# Patient Record
Sex: MALE | Race: OTHER | HISPANIC OR LATINO
[De-identification: names, ages, dates, MRNs, and addresses within clinical notes are randomized per-mention and may not be internally consistent; named-entity substitution may affect disease eponyms.]

---

## 2018-10-12 ENCOUNTER — FORM ENCOUNTER (OUTPATIENT)
Age: 58
End: 2018-10-12

## 2020-04-23 ENCOUNTER — EMERGENCY (EMERGENCY)
Facility: HOSPITAL | Age: 60
LOS: 1 days | Discharge: HOME | End: 2020-04-23
Admitting: EMERGENCY MEDICINE
Payer: COMMERCIAL

## 2020-04-23 VITALS
DIASTOLIC BLOOD PRESSURE: 71 MMHG | TEMPERATURE: 98 F | RESPIRATION RATE: 18 BRPM | HEART RATE: 110 BPM | OXYGEN SATURATION: 97 % | SYSTOLIC BLOOD PRESSURE: 150 MMHG

## 2020-04-23 VITALS
HEART RATE: 91 BPM | OXYGEN SATURATION: 99 % | TEMPERATURE: 98 F | DIASTOLIC BLOOD PRESSURE: 68 MMHG | SYSTOLIC BLOOD PRESSURE: 133 MMHG | RESPIRATION RATE: 17 BRPM

## 2020-04-23 LAB
ALBUMIN SERPL ELPH-MCNC: 2.9 G/DL — LOW (ref 3.4–5)
ALP SERPL-CCNC: 173 U/L — HIGH (ref 40–120)
ALT FLD-CCNC: 36 U/L — SIGNIFICANT CHANGE UP (ref 12–42)
ANION GAP SERPL CALC-SCNC: 9 MMOL/L — SIGNIFICANT CHANGE UP (ref 9–16)
APTT BLD: 33.5 SEC — SIGNIFICANT CHANGE UP (ref 27.5–36.3)
AST SERPL-CCNC: 144 U/L — HIGH (ref 15–37)
BASOPHILS # BLD AUTO: 0.03 K/UL — SIGNIFICANT CHANGE UP (ref 0–0.2)
BASOPHILS NFR BLD AUTO: 0.6 % — SIGNIFICANT CHANGE UP (ref 0–2)
BILIRUB SERPL-MCNC: 1.7 MG/DL — HIGH (ref 0.2–1.2)
BUN SERPL-MCNC: 13 MG/DL — SIGNIFICANT CHANGE UP (ref 7–23)
CALCIUM SERPL-MCNC: 7.9 MG/DL — LOW (ref 8.5–10.5)
CHLORIDE SERPL-SCNC: 104 MMOL/L — SIGNIFICANT CHANGE UP (ref 96–108)
CO2 SERPL-SCNC: 22 MMOL/L — SIGNIFICANT CHANGE UP (ref 22–31)
CREAT SERPL-MCNC: 0.83 MG/DL — SIGNIFICANT CHANGE UP (ref 0.5–1.3)
EOSINOPHIL # BLD AUTO: 0.04 K/UL — SIGNIFICANT CHANGE UP (ref 0–0.5)
EOSINOPHIL NFR BLD AUTO: 0.8 % — SIGNIFICANT CHANGE UP (ref 0–6)
GLUCOSE SERPL-MCNC: 118 MG/DL — HIGH (ref 70–99)
HCT VFR BLD CALC: 32.6 % — LOW (ref 39–50)
HGB BLD-MCNC: 11.1 G/DL — LOW (ref 13–17)
IMM GRANULOCYTES NFR BLD AUTO: 0.4 % — SIGNIFICANT CHANGE UP (ref 0–1.5)
INR BLD: 1.3 — HIGH (ref 0.88–1.16)
LYMPHOCYTES # BLD AUTO: 0.92 K/UL — LOW (ref 1–3.3)
LYMPHOCYTES # BLD AUTO: 18.9 % — SIGNIFICANT CHANGE UP (ref 13–44)
MCHC RBC-ENTMCNC: 34 GM/DL — SIGNIFICANT CHANGE UP (ref 32–36)
MCHC RBC-ENTMCNC: 34.9 PG — HIGH (ref 27–34)
MCV RBC AUTO: 102.5 FL — HIGH (ref 80–100)
MONOCYTES # BLD AUTO: 0.53 K/UL — SIGNIFICANT CHANGE UP (ref 0–0.9)
MONOCYTES NFR BLD AUTO: 10.9 % — SIGNIFICANT CHANGE UP (ref 2–14)
NEUTROPHILS # BLD AUTO: 3.33 K/UL — SIGNIFICANT CHANGE UP (ref 1.8–7.4)
NEUTROPHILS NFR BLD AUTO: 68.4 % — SIGNIFICANT CHANGE UP (ref 43–77)
NRBC # BLD: 0 /100 WBCS — SIGNIFICANT CHANGE UP (ref 0–0)
PLATELET # BLD AUTO: 50 K/UL — LOW (ref 150–400)
POTASSIUM SERPL-MCNC: 4.7 MMOL/L — SIGNIFICANT CHANGE UP (ref 3.5–5.3)
POTASSIUM SERPL-SCNC: 4.7 MMOL/L — SIGNIFICANT CHANGE UP (ref 3.5–5.3)
PROT SERPL-MCNC: 7.6 G/DL — SIGNIFICANT CHANGE UP (ref 6.4–8.2)
PROTHROM AB SERPL-ACNC: 14.6 SEC — HIGH (ref 10–12.9)
RBC # BLD: 3.18 M/UL — LOW (ref 4.2–5.8)
RBC # FLD: 16.2 % — HIGH (ref 10.3–14.5)
SARS-COV-2 RNA SPEC QL NAA+PROBE: SIGNIFICANT CHANGE UP
SODIUM SERPL-SCNC: 135 MMOL/L — SIGNIFICANT CHANGE UP (ref 132–145)
TROPONIN I SERPL-MCNC: <0.017 NG/ML — LOW (ref 0.02–0.06)
TSH SERPL-MCNC: 3.23 UIU/ML — SIGNIFICANT CHANGE UP (ref 0.36–3.74)
WBC # BLD: 4.87 K/UL — SIGNIFICANT CHANGE UP (ref 3.8–10.5)
WBC # FLD AUTO: 4.87 K/UL — SIGNIFICANT CHANGE UP (ref 3.8–10.5)

## 2020-04-23 PROCEDURE — 93010 ELECTROCARDIOGRAM REPORT: CPT

## 2020-04-23 PROCEDURE — 99285 EMERGENCY DEPT VISIT HI MDM: CPT

## 2020-04-23 PROCEDURE — 71045 X-RAY EXAM CHEST 1 VIEW: CPT | Mod: 26

## 2020-04-23 NOTE — ED PROVIDER NOTE - PATIENT PORTAL LINK FT
You can access the FollowMyHealth Patient Portal offered by [facility] by registering at the following website: [email or web address]. By joining [employer]’s FollowMyHealth portal, you will also be able to view your health information using other applications (apps) compatible with our system.

## 2020-04-23 NOTE — ED PROVIDER NOTE - NSFOLLOWUPINSTRUCTIONS_ED_ALL_ED_FT
You have a low platelet count. Please follow up with your primary care physician to have your blood work drawn in 1 week to recheck your platelet count and to be further evaluated for this.    Return to the ER immediately if your dizziness or racing heart beat returns, or if you develop a high fever, shaking chills, chest pain, difficulty breathing, loss of consciousness, any abnormal bleeding or bruising, or for any other concerns.

## 2020-04-23 NOTE — ED PROVIDER NOTE - CLINICAL SUMMARY MEDICAL DECISION MAKING FREE TEXT BOX
EKG, labs and imaging reviewed. Pt informed of low platelet count and elevated LFTs, likely related to ETOH. Pt remains asymptomatic with no other complaints at this time and would like to be discharged home. No recent abnormal bruising or bleeding. Discussed case with Dr. Lynch (Hematology) who recommends having pt F/U with PMD for repeat labs in 1 week. Pt agrees to F/U as instructed. Strict return precautions reviewed with pt in which pt verbalizes understanding and agrees to.

## 2020-04-23 NOTE — ED PROVIDER NOTE - OBJECTIVE STATEMENT
58 y/o male with PMHx of HTN presents to the ED with complaints of an episode of nonspecific dizziness associated with racing palpitations and breathing with a moment of chest tightness that [place]. Patient states that symptoms lasted for several minutes without any aggravating or alleviating factors in which 911 was called, and EMS shortly arrived afterwards. Patient was transported without any incidences of complaint, and symptoms slowly improved en route. Patient is now feeling much better and states that he assumes symptoms were likely attributed to his HTN. He did not have any associated symptoms at the time, and denies any additional symptoms in the ED. Denies fever, chills, headache, confusion, syncope, SOB, cough, hemoptysis, abdominal pain, N/V/D, focal weakness, fall, or injuries. 60 y/o male with PMHx of HTN and ETOH abuse presents to the ED with complaints of an episode of nonspecific dizziness associated with racing palpitations and breathing with a moment of chest tightness that [place]. Patient states that symptoms lasted for several minutes without any aggravating or alleviating factors in which 911 was called, and EMS shortly arrived afterwards. Patient was transported without any incidences of complaint, and symptoms slowly improved en route. Patient is now feeling much better and states that he assumes symptoms were likely attributed to his HTN. He did not have any associated symptoms at the time, and denies any additional symptoms in the ED. Denies fever, chills, headache, confusion, syncope, SOB, cough, hemoptysis, abdominal pain, N/V/D, focal weakness, fall, or injuries.

## 2020-04-23 NOTE — ED PROVIDER NOTE - CHPI ED SYMPTOMS NEG
no confusion/no fever/no chills, no headache, no syncope, no cough, no hemoptysis, no abdominal pain, no N/V/D, no focal weakness, no fall, no injuries

## 2020-04-27 DIAGNOSIS — Z20.828 CONTACT WITH AND (SUSPECTED) EXPOSURE TO OTHER VIRAL COMMUNICABLE DISEASES: ICD-10-CM

## 2020-04-27 DIAGNOSIS — R42 DIZZINESS AND GIDDINESS: ICD-10-CM

## 2022-04-05 NOTE — ED ADULT NURSE NOTE - DRUG PRE-SCREENING (DAST -1)
Routing refill request to provider for review/approval because:  Drug not on the [facility] refill protocol        Statement Selected

## 2022-05-17 NOTE — ED ADULT TRIAGE NOTE - TEMPERATURE IN FAHRENHEIT (DEGREES F)
Impression: Other long term (current) drug therapy: Z79.899. Plan: Plaquenil use without macular toxicity, ok to cont med. No ring of depigmented retinal pigment epithelium OU
PLAQUENIL - confirmed dose 200mg daily. NO medication induced retinal damage. YEARLY exam.  
OCT images shows no damages to macular. * Get HVF 10-2 and SD OCT annually as recommended by  Plaquenil Retinal Surveillance Protocol RTC in 6 months for CE with REPEAT MAC and HVF 10-2 with Dr. Franck Vizcarra 98

## 2023-02-21 ENCOUNTER — HOSPITAL ENCOUNTER (INPATIENT)
Dept: HOSPITAL 74 - YASAS | Age: 63
LOS: 4 days | Discharge: LEFT BEFORE DISCHARGE | DRG: 770 | End: 2023-02-25
Attending: SURGERY | Admitting: ALLERGY & IMMUNOLOGY
Payer: COMMERCIAL

## 2023-02-21 VITALS — BODY MASS INDEX: 29.9 KG/M2

## 2023-02-21 DIAGNOSIS — D69.6: ICD-10-CM

## 2023-02-21 DIAGNOSIS — F10.230: Primary | ICD-10-CM

## 2023-02-21 DIAGNOSIS — K74.60: ICD-10-CM

## 2023-02-21 PROCEDURE — HZ2ZZZZ DETOXIFICATION SERVICES FOR SUBSTANCE ABUSE TREATMENT: ICD-10-PCS | Performed by: SURGERY

## 2023-02-21 PROCEDURE — U0003 INFECTIOUS AGENT DETECTION BY NUCLEIC ACID (DNA OR RNA); SEVERE ACUTE RESPIRATORY SYNDROME CORONAVIRUS 2 (SARS-COV-2) (CORONAVIRUS DISEASE [COVID-19]), AMPLIFIED PROBE TECHNIQUE, MAKING USE OF HIGH THROUGHPUT TECHNOLOGIES AS DESCRIBED BY CMS-2020-01-R: HCPCS

## 2023-02-21 PROCEDURE — U0005 INFEC AGEN DETEC AMPLI PROBE: HCPCS

## 2023-02-21 RX ADMIN — Medication PRN MG: at 22:44

## 2023-02-21 RX ADMIN — Medication SCH MG: at 22:44

## 2023-02-22 LAB
ALBUMIN SERPL-MCNC: 2.4 G/DL (ref 3.4–5)
ALP SERPL-CCNC: 199 U/L (ref 45–117)
ALT SERPL-CCNC: 55 U/L (ref 13–61)
ANION GAP SERPL CALC-SCNC: 7 MMOL/L (ref 8–16)
AST SERPL-CCNC: 131 U/L (ref 15–37)
BILIRUB SERPL-MCNC: 5.4 MG/DL (ref 0.2–1)
BUN SERPL-MCNC: 7.4 MG/DL (ref 7–18)
CALCIUM SERPL-MCNC: 8 MG/DL (ref 8.5–10.1)
CHLORIDE SERPL-SCNC: 105 MMOL/L (ref 98–107)
CO2 SERPL-SCNC: 24 MMOL/L (ref 21–32)
CREAT SERPL-MCNC: 0.7 MG/DL (ref 0.55–1.3)
DEPRECATED RDW RBC AUTO: 19.5 % (ref 11.9–15.9)
GLUCOSE SERPL-MCNC: 89 MG/DL (ref 74–106)
HCT VFR BLD CALC: 33.6 % (ref 35.4–49)
HGB BLD-MCNC: 11.5 GM/DL (ref 11.7–16.9)
HIV 1+2 AB+HIV1 P24 AG SERPL QL IA: NEGATIVE
MCH RBC QN AUTO: 35.6 PG (ref 25.7–33.7)
MCHC RBC AUTO-ENTMCNC: 34.2 G/DL (ref 32–35.9)
MCV RBC: 104.1 FL (ref 80–96)
PLATELET # BLD AUTO: 20 10^3/UL (ref 134–434)
PMV BLD: 10.1 FL (ref 7.5–11.1)
PROT SERPL-MCNC: 7.3 G/DL (ref 6.4–8.2)
RBC # BLD AUTO: 3.23 M/MM3 (ref 4–5.6)
SODIUM SERPL-SCNC: 137 MMOL/L (ref 136–145)
WBC # BLD AUTO: 2.6 K/MM3 (ref 4–10)

## 2023-02-22 RX ADMIN — Medication SCH TAB: at 10:17

## 2023-02-22 RX ADMIN — Medication PRN MG: at 22:14

## 2023-02-22 RX ADMIN — Medication SCH MG: at 22:14

## 2023-02-23 ENCOUNTER — HOSPITAL ENCOUNTER (EMERGENCY)
Dept: HOSPITAL 74 - JER | Age: 63
Discharge: HOME | End: 2023-02-23
Payer: COMMERCIAL

## 2023-02-23 VITALS — RESPIRATION RATE: 16 BRPM | DIASTOLIC BLOOD PRESSURE: 52 MMHG | SYSTOLIC BLOOD PRESSURE: 120 MMHG

## 2023-02-23 VITALS — BODY MASS INDEX: 29.9 KG/M2

## 2023-02-23 VITALS — HEART RATE: 80 BPM | TEMPERATURE: 97.9 F

## 2023-02-23 DIAGNOSIS — D69.6: ICD-10-CM

## 2023-02-23 DIAGNOSIS — K70.30: Primary | ICD-10-CM

## 2023-02-23 LAB
ALBUMIN SERPL-MCNC: 2.2 G/DL (ref 3.4–5)
ALP SERPL-CCNC: 242 U/L (ref 45–117)
ALT SERPL-CCNC: 49 U/L (ref 13–61)
ANION GAP SERPL CALC-SCNC: 5 MMOL/L (ref 8–16)
AST SERPL-CCNC: 101 U/L (ref 15–37)
BILIRUB SERPL-MCNC: 5.2 MG/DL (ref 0.2–1)
BUN SERPL-MCNC: 6.4 MG/DL (ref 7–18)
CALCIUM SERPL-MCNC: 8.7 MG/DL (ref 8.5–10.1)
CHLORIDE SERPL-SCNC: 107 MMOL/L (ref 98–107)
CO2 SERPL-SCNC: 28 MMOL/L (ref 21–32)
CREAT SERPL-MCNC: 0.9 MG/DL (ref 0.55–1.3)
DEPRECATED RDW RBC AUTO: 18.4 % (ref 11.9–15.9)
GLUCOSE SERPL-MCNC: 216 MG/DL (ref 74–106)
HCT VFR BLD CALC: 30.9 % (ref 35.4–49)
HGB BLD-MCNC: 10.5 GM/DL (ref 11.7–16.9)
INR BLD: 1.58 (ref 0.83–1.09)
MCH RBC QN AUTO: 35.9 PG (ref 25.7–33.7)
MCHC RBC AUTO-ENTMCNC: 33.8 G/DL (ref 32–35.9)
MCV RBC: 106.1 FL (ref 80–96)
PLATELET # BLD AUTO: 18 10^3/UL (ref 134–434)
PMV BLD: 9.8 FL (ref 7.5–11.1)
PROT SERPL-MCNC: 6.6 G/DL (ref 6.4–8.2)
PT PNL PPP: 18.2 SEC (ref 9.7–13)
RBC # BLD AUTO: 2.92 M/MM3 (ref 4–5.6)
SODIUM SERPL-SCNC: 140 MMOL/L (ref 136–145)
WBC # BLD AUTO: 2.1 K/MM3 (ref 4–10)

## 2023-02-23 RX ADMIN — Medication SCH: at 22:55

## 2023-02-23 RX ADMIN — Medication SCH TAB: at 10:18

## 2023-02-24 RX ADMIN — Medication PRN MG: at 22:33

## 2023-02-24 RX ADMIN — Medication SCH TAB: at 10:14

## 2023-02-24 RX ADMIN — Medication SCH MG: at 22:33

## 2023-02-25 VITALS
TEMPERATURE: 98.2 F | HEART RATE: 81 BPM | SYSTOLIC BLOOD PRESSURE: 144 MMHG | RESPIRATION RATE: 18 BRPM | DIASTOLIC BLOOD PRESSURE: 78 MMHG

## 2023-02-25 RX ADMIN — Medication SCH: at 09:50

## 2023-05-24 PROBLEM — Z00.00 ENCOUNTER FOR PREVENTIVE HEALTH EXAMINATION: Status: ACTIVE | Noted: 2023-05-24

## 2023-05-25 PROBLEM — F10.10 ALCOHOL ABUSE, UNCOMPLICATED: Chronic | Status: ACTIVE | Noted: 2020-04-23

## 2023-05-25 PROBLEM — I10 ESSENTIAL (PRIMARY) HYPERTENSION: Chronic | Status: ACTIVE | Noted: 2020-04-23

## 2023-06-07 ENCOUNTER — NON-APPOINTMENT (OUTPATIENT)
Age: 63
End: 2023-06-07

## 2023-06-07 ENCOUNTER — APPOINTMENT (OUTPATIENT)
Dept: TRANSPLANT | Facility: CLINIC | Age: 63
End: 2023-06-07

## 2023-06-07 ENCOUNTER — LABORATORY RESULT (OUTPATIENT)
Age: 63
End: 2023-06-07

## 2023-06-07 ENCOUNTER — APPOINTMENT (OUTPATIENT)
Dept: HEPATOLOGY | Facility: CLINIC | Age: 63
End: 2023-06-07
Payer: COMMERCIAL

## 2023-06-07 ENCOUNTER — APPOINTMENT (OUTPATIENT)
Dept: TRANSPLANT | Facility: CLINIC | Age: 63
End: 2023-06-07
Payer: MEDICAID

## 2023-06-07 VITALS
HEIGHT: 65 IN | BODY MASS INDEX: 28.66 KG/M2 | WEIGHT: 172 LBS | HEART RATE: 74 BPM | SYSTOLIC BLOOD PRESSURE: 138 MMHG | DIASTOLIC BLOOD PRESSURE: 83 MMHG | OXYGEN SATURATION: 99 % | TEMPERATURE: 98.2 F

## 2023-06-07 PROCEDURE — 99215 OFFICE O/P EST HI 40 MIN: CPT

## 2023-06-07 PROCEDURE — 99205 OFFICE O/P NEW HI 60 MIN: CPT

## 2023-06-07 NOTE — REASON FOR VISIT
[Initial] : an initial visit for [Liver Transplant Evaluation] : liver transplant evaluation [Source: ________] : History obtained from [unfilled] [FreeTextEntry2] : Dr. Baez

## 2023-06-07 NOTE — END OF VISIT
[FreeTextEntry3] : Attending note \par \par Problem list \par Cirrhosis due  to AUD\par Blood type O\par EV\par No hx of variceal bleeding \par LLE hematoma, with no trauma, required surgery due to compartment syndrome \par \par He feels OK now\par Patient feels ok\par No new event\par no NVD\par No rectal bleeding \par No chest pain or SOB \par No cough\par No dizziness or confusion\par No  symptoms  \par ROS otherwise negative \par \par Meds reviewed \par Labs and imaging reviewed \par \par Stable VS\par Mild icterus \par No ascites \par Clear lungs with no crackle\par Regular heartbeats with no murmur.\par Soft abdomen.  No ascites.  He has a splenomegaly.\par Mild edema of the right lower extremity.  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\par Patient is here for liver transplant evaluation\par I had a long discussion with patient and family and following topics were discussed\par Indication for liver transplant\par Overall review of the procedure\par Listing criteria\par MELD and organ allocation\par Need for compliance with care including visits, taking medications and avoiding alcohol and illicit substances \par Options of living donor liver transplant, multiple listing, DCD, PHS risk organs as potential ways of improving wait time\par Potential risks associated with liver transplant  \par Quality measures\par medication and food interaction\par Diet\par Exercise \par Patient does not have any major risk factor for poor outcome of the liver transplant.\par Transplant education was provided\par Importance of alcohol avoidance was discussed with him in strongest terms\par He is a good candidate for liver transplant, however, I am hoping that his condition improves with cessation of alcohol consumption.\par

## 2023-06-07 NOTE — ASSESSMENT
[FreeTextEntry1] : \par Patient came in for Initial liver transplant evaluation. Pt met with members of the team: Transplant Surgeon Dr. Puente, Hepatologist , Transplant Coordinator, , Dietician, Pharmacist,  and ASA. Liver Transplant Education provided via Power Point presentation, overview given of transplant evaluation process, risks/benefits of transplantation, live donor/ donor transplantation,PHS risk/DCD/HCV organ sources, Waitlist management, post transplant care/medication/clinic follow-up. Provided and reviewed educational packet.  All questions answered and team contact information provided. Medications reviewed and updated. Patient reviewed and signed: Informed consent for liver transplant evaluation, HIPPA, [employer], email and HIV form. Patient was advised to inform the transplant coordinator with any changes in health status. \par \par HCV donor acceptance consent - YES\par \par Required testing/labs reviewed with pt.  Once said testing has been completed, Transplant Coordinator will present to the selection committee\par \par Assessment and Plan:  63 yo M with hx AUD and ETOH cirr decompensated with EV, no hx bleeding.  No HCC.\par \par Pt consented to liver txp evaluation.  The following testing is needed:\par \par Liver Evaluation Labs\par HCC screening/surveillance, MRI\par Cardiac, Risk Factors, age - DSE, refer to cards\par EGD, needs updated\par COLON, 8 yrs ago - scd with Dr. Flores\par Infectious Disease consult\par SW recommendations\par RD recommendations\par Dental Yes\par Donor option, not at this time

## 2023-06-07 NOTE — END OF VISIT
[Time Spent: ___ minutes] : I have spent [unfilled] minutes of time on the encounter. [>50% of the face to face encounter time was spent on counseling and/or coordination of care for ___] : Greater than 50% of the face to face encounter time was spent on counseling and/or coordination of care for [unfilled] [Attestation] : The patient was explained alternatives, benefits and risk of liver transplantation, including but not limited to infection, bleeding, hepatic artery or portal vein thrombosis, primary dysfunction or primary non-function of the liver allograft, cardiopulmonary arrest, intra-operative death and other surgical, medical and psychosocial risks as outlined in the evaluation consent form.  The patient understands these risks and is willing to proceed with liver transplantation. The patient was also explained the need to remain on lifelong anti-rejection medications.  We discussed the risks and side effects of immunosuppressive medications including, but not limited to infection, cancer, weight gain, new onset or worsening of diabetes or hypertension in a temporary or permanent state, kidney dysfunction, water retention, back pain, constipation, diarrhea, dizziness, headache, joint pain, loss of appetite, nausea, stomach pain or upset, trouble sleeping, vomiting, and mental or mood changes.  An overview of the follow-up protocol was reviewed including outpatient visits, blood tests and the potential for hospital readmission.  The patient understands these risks and is willing to proceed with liver transplantation. We also discussed the available donor organ pool. We discussed the assessment of the  donor including age, cause of death, cardiac arrest, electrolyte abnormalities, course and length of hospital stay, use of vasopressors, hepatitis and HIV testing. We reviewed organ donor risk factors that could affect the success of the graft or the health of the patient, including, but not limited to, the donor's history, condition or age of the organs used, or the patient's potential risk of amish the human immunodeficiency virus and other infectious diseases if the disease cannot be detected in an infected donor.  We discussed and defined the option of an extended criteria for cadaveric donors (Hepatitis B core Ab positive donor, Hepatitis C Ab positive donor, steatosis, older donors, split livers and DCD donors) and early and late outcomes of graft survival after transplantation. The options of  donor liver transplantation vs. live donor liver transplantation were discussed with the patient.  Differences between donation after cardiac death (DCD) compared to donation after brain death (DBD) liver transplantation were also fully disclosed and included lower graft survival rates, the increased incidence of hepatic artery stenosis, bile duct injury, ischemic cholangiopathy and increased re transplant rates seen in recipients of DCD donor livers. The use of the U.S. Public Health Services (PHS) Guideline has defined some donors as "Increased Risk Donors" based on their history which may suggest socially increased risk behaviors was discussed. The patient is aware that if PHS increased risk donor is offered to the candidate, the transplant team will discuss the specifics to assist with making an informed decision. We discussed our post-transplant protocol of serology testing if the candidate receives an organ that is PHS increased risk. The patient was made aware that it is against the law to be paid or to pay to donate an organ.  If any money was given or will be given in exchange for receiving an organ, the patient may be subject to criminal prosecution, and any insurance coverage may no longer apply and patient may become personally responsible for all the health care costs associated with the donation, and private health information will be available to law enforcement agencies. We explained that we store vessels for subsequent later use in transplants.  Again, we discussed the extensive testing done on  donors prior to donation, however despite an extensive evaluation on the donor, there is potential risk a recipient may contract infectious diseases (HIV or Hepatitis) or cancer if they cannot be detected in the donor. In the cases where PHS Increased Risk donor vessels are used, we test the recipient per protocol post-transplant for any potential infectious disease transmission. The patient understands that there is the potential of use of  donor vessels and PHS increased risk donor vessels. The patient understands these risks and is willing to receive potentially PHS increased risk donor vessels. We also discussed the MELD allocation system in depth and the one-year observed and expected patient and graft survival rates according to latest data from the Scientific Registry for Transplant Recipients. These center specific outcomes were provided in comparison to the national one-year averages as described in the evaluation consent forms. Prior to signing consent, the patient was given an opportunity to ask questions.  After all concerns were addressed, informed consent was signed. Patient is aware that they may withdraw their consent for transplantation at any time.  Further, the patient is aware of the right to refuse an organ offer without penalty at any time.

## 2023-06-07 NOTE — REASON FOR VISIT
[Initial] : an initial visit for [Liver Transplant Evaluation] : liver transplant evaluation [Spouse] : spouse [FreeTextEntry2] : Dr Bashir Baez

## 2023-06-07 NOTE — HISTORY OF PRESENT ILLNESS
[Alcoholic Liver Disease] : Alcoholic Liver Disease [Non-Bleeding Varices] : Non-Bleeding Varices [Not Working] : Not working [History of HCC] : No history of hepatocellular carcinoma [Previous Transplant] : No history of previous transplant [Diabetes] : No history of diabetes [Previous MI] : No history of previous MI [Dialysis] : No history of dialysis [FreeTextEntry1] : 20 [FreeTextEntry2] : O [FreeTextEntry3] : Worked as  until few months ago [TextBox_42] : 62M with Etoh cirrhosis referred for evaluation for liver transplant.\par \par Patient has history of AUD and ETOH cirrhosis complicated by portal hypertension manifested by EV, no hx bleeding. No ascites. No HE. No HCC.\par Last EGD was 2 yrs ago at which time he was put on Nadolol when he was diagnosed with cirrhosis. He has not been compliant with followup.\par He was hospitalized at [facility] 6 months ago for alcohol withdrawal symptoms.\par \par Was admitted to [facility] 6 weeks ago for vascular surgery for LLE compartment syndrome due to intramuscular hematoma with active extravasation of L gastrocnemius muscle, s/p fasciotomy on 4/23/23. \par Per notes, it was thought that hematoma which caused compartment syndrome was due to severe thrombocytopenia. Hyperbilirubinemia was attributed to hemolysis and resorption of hematoma and less likely due to etoh hepatitis or Acute liver failure.\par \par Did not follow with a hepatologist, was referred after hospitalization/surgery to Dr. Baez who referred for OTLx eval. \par \par Currently he has no complaints. He denies any fevers, jaundice, abdominal pain, nausea, diarrhea, hematemesis, melena or blood per rectum.\par He is ambulating fine without any assistance and wound is healing.\par \par PMH: Etoh cirrhosis\par PSH: LLE fasciotomy\par Meds: none (used to take nadolol)\par Allergies: NKDA\par Social: Quit etoh 8 months ago. Denies tob, drugs\par , lives with wife and 3 children\par started drinking at age 14\par FMH: No cirrhosis or malignancy\par \par ROS: Cardiac - no MI, CHF, CAD\par Pulmonary- no h/o COPD, Asthma, or pneumonia\par Infections- no h/o TB, HIV, Hepatitis. +vaccinated for covid. \par Heme- no h/o malignancy or bleeding disorders. No DVT/PE\par Endo- No Diabetes or Thyroid disease\par Neuro- no h/o CVA or seizures. \par Sensitization events- no previous blood transfusions or previous transplant. Did receive platelets at [facility]\par  - No UTI or Kidney stones\par GI - Last EGD 2 years ago. Colonoscopy 8 years ago\par \par LABS 5/17/23\par Na 136\par Alb 2.5\par *TB 5.5\par \par GGTP 35\par ALT 14\par AST 46\par SCr 0.7\par INR 1.7\par \par Ferritin 238.5\par Fe 218\par TIBC 224\par \par AFP 2.7\par \par STUDIES\par \par ABDL US 4/25/23\par Vessels: Unremarkable visualized components of the abdl aorta and inf vena cava.\par Unremarkable visualized components of the hepatic veins.\par Normal hepatopedal flow within portal vein.\par Diminished flow velocity within main portal vein. \par \par Liver: heterogeneous liver with nodular surface contour. Findings c/w cirrhosis.\par Diffuse fatty infiltration of the liver. No masses. No e/f intrahep ductal diliatation.\par 12.3 cm sagittal dimension\par GB: Not distended, No pericholecystic fluid. No gallstones.\par Prox CBD normal in caliber and measures 4.2mm\par R Kidney: 12.2 cm sagittal dimension. Increased in echogenicity. R renal scarring. No hydro.\par Pancreas: Obscured by bowel gas artifact.\par \par IMPRESSION LTD study dt portable technique\par Cirrhotic liver. Heterogenous liver. Hep steatosis. No masses.\par Patent hepatopedal flow. \par Diminished flow velocity within the main portal vein.\par Echogenic R kidney c/w the medical renal disease. \par No bile duct dilatation.\par Obscured pancreas.\par

## 2023-06-07 NOTE — ASSESSMENT
[Good candidate] : a good candidate. We should proceed with our protocol for evaluation for liver transplantation. [FreeTextEntry1] : Needs cardiac eval\par colonoscopy\par MRI to check for HCC\par routine transplant workup and screening with social work and dietician

## 2023-06-07 NOTE — PHYSICAL EXAM
[No Acute Distress] : no acute distress [No Lymphadenopathy] : no lymphadenopathy [No Abnormal Masses] : no abnormal masses [Breathing Comfortably on room air] : breathing comfortably on room air [Normal Rate] : normal rate [Soft] : soft [] : right dorsalis pedis palpable [Scleral Icterus] : no scleral icterus [Ascites Fluid Wave] : no ascites fluid wave [Abdominal Ascites] : no abdominal ascites [Caput Medusae] : no caput medusae [Umbilical Hernia] : no umbilical hernia [Spider Angioma] : no spider angioma [Jaundice] : no jaundice [Asterixis] : no asterixis [Hepatic Encephalopathy] : no hepatic encephalopathy [FreeTextEntry1] : slight edema LLE. Healing faciotomy site medially. +granulation tissue. No erythema or discharge. Skin partially opne measuring ~3x1cm

## 2023-06-07 NOTE — HISTORY OF PRESENT ILLNESS
[Alcoholic Liver Disease] : Alcoholic Liver Disease [Non-Bleeding Varices] : Non-Bleeding Varices [Previous Transplant] : No history of previous transplant [FreeTextEntry1] : 20 [FreeTextEntry2] : O [TextBox_42] : \par Wil Soriano is a 61 yo M with hx of AUD and ETOH cirr complicated by portal hypertension manifested by EV, no hx bleeding.  No ascites.  No HE.  No HCC.\par Last EGD was 2 yrs ago at which time he was put on Nadolol when he was diagnosed with cirr.  Never followed up.\par Had hospitalization 6 mos ago at [facility] when went to hosp for alcohol withdrawal symptoms.\par \par Recent hosp at [facility] for vascular surgery for LLE compartment syndrome due to intramuscular hematoma with active extravasation of L gastrocnemius muscle, s/p fasciotomy on 4/23/23.\par Per notes, it is likely that hematoma which caused compartment syndrome is s/t severe thrombocytopenia r/t hx chronic alc use, cirr and liver dysfunction.  Hyperbilirubinemia m/l indirect and likely s/t hemolysis and resorption of his hematoma and less likely d/t alc hepatitis or Acute liver failure as his cirr appears to be well compensated.\par \par Did not follow with a hepatologist, was referred after hospitalization/surgery to Dr. Baez who referred for OTLx eval. \par EV, nadolol\par \par Sober – 8 mos ago, inpatient for one month RPP, no rehab now\par \par MELD 20  [ABO O]\par \par LABS 5/17/23\par Na 136\par Alb 2.5\par *TB 5.5\par \par GGTP 35\par ALT 14\par AST 46\par SCr 0.7\par INR 1.7\par \par Ferritin 238.5\par Fe 218\par TIBC 224\par \par AFP 2.7\par \par STUDIES\par \par ABDL US 4/25/23\par Vessels: Unremarkable visualized components of the abdl aorta and inf vena cava.\par Unremarkable visualized components of the hepatic veins.\par Normal hepatopedal flow within portal vein.\par Diminished flow velocity within main portal vein. \par \par Liver:  heterogeneous liver with nodular surface contour. Findings c/w cirrhosis.\par Diffuse fatty infiltration of the liver.  No masses .  No e/f intrahep ductal diliatation.\par 12.3 cm sagittal dimension\par GB: Not distended,  No pericholecystic fluid.  No gallstones.\par Prox CBD normal in caliber and measures 4.2mm\par R Kidney:  12.2 cm sagittal dimension.  Increased in echogenicity.  R renal scarring.  No hydro.\par Pancreas:  Obscured by bowel gas artifact.\par \par IMPRESSION  LTD study dt portable technique\par Cirrhotic liver. Heterogenous liver.  Hep steatosis.  No masses.\par Patent hepatopedal flow. \par Diminished flow velocity within the main portal vein.\par Echogenic R kidney c/w the medical renal disease. \par No bile duct dilatation.\par Obscured pancreas.\par \par NKA\par \par MEDICATIONS - NOT taking any medications\par had been discharged on\par Folic acid 1mg daily\par Nadolol 20mg daily - not taking now, ran out\par Pantoprazole 40mg daily\par \par PSH\par Fasciotomy, decompressive, LE, posterior compartment  4/23/23\par \par SOCIAL HX\par , wife Kylie, 3 children\par former , stopped working 3 months ago\par Smoking  - never smoker\par ETOH - 6 pack/beer all day and hard liquor since age 14\par Drugs - denies\par Herbals - none\par Covid vax Pfizer x 3\par Coviid infection, 2020\par

## 2023-06-09 ENCOUNTER — NON-APPOINTMENT (OUTPATIENT)
Age: 63
End: 2023-06-09

## 2023-06-09 LAB
ABO + RH PNL BLD: NORMAL
AFP-TM SERPL-MCNC: 3.9 NG/ML
ALBUMIN SERPL ELPH-MCNC: 3.1 G/DL
ALP BLD-CCNC: 234 U/L
ALT SERPL-CCNC: 18 U/L
AMMONIA PLAS-MCNC: 39.8 UMOL/L
ANION GAP SERPL CALC-SCNC: 11 MMOL/L
APPEARANCE: CLEAR
AST SERPL-CCNC: 57 U/L
BACTERIA: NEGATIVE /HPF
BILIRUB SERPL-MCNC: 3.5 MG/DL
BILIRUBIN URINE: NEGATIVE
BLOOD URINE: NEGATIVE
BUN SERPL-MCNC: 9 MG/DL
CALCIUM OXALATE CRYSTALS: PRESENT
CALCIUM SERPL-MCNC: 8.6 MG/DL
CAST: 1 /LPF
CHLORIDE SERPL-SCNC: 104 MMOL/L
CMV IGG SERPL QL: >10 U/ML
CMV IGG SERPL-IMP: POSITIVE
CO2 SERPL-SCNC: 24 MMOL/L
COLOR: NORMAL
COVID-19 SPIKE DOMAIN ANTIBODY INTERPRETATION: POSITIVE
CREAT SERPL-MCNC: 0.71 MG/DL
CREAT SPEC-SCNC: 128 MG/DL
CREAT/PROT UR: 0.1 RATIO
EBV EA AB SER IA-ACNC: 20.3 U/ML
EBV EA AB TITR SER IF: POSITIVE
EBV EA IGG SER QL IA: 67.7 U/ML
EBV EA IGG SER-ACNC: POSITIVE
EBV EA IGM SER IA-ACNC: NEGATIVE
EBV PATRN SPEC IB-IMP: NORMAL
EBV VCA IGG SER IA-ACNC: >750 U/ML
EBV VCA IGM SER QL IA: 14.5 U/ML
EGFR: 103 ML/MIN/1.73M2
EPITHELIAL CELLS: 1 /HPF
EPSTEIN-BARR VIRUS CAPSID ANTIGEN IGG: POSITIVE
ESTIMATED AVERAGE GLUCOSE: 85 MG/DL
ETHANOL BLD-MCNC: <10 MG/DL
GLUCOSE QUALITATIVE U: NEGATIVE MG/DL
GLUCOSE SERPL-MCNC: 103 MG/DL
HAV IGM SER QL: NONREACTIVE
HBA1C MFR BLD HPLC: 4.6 %
HBV CORE IGG+IGM SER QL: NONREACTIVE
HBV SURFACE AB SERPL IA-ACNC: <3 MIU/ML
HBV SURFACE AG SER QL: NONREACTIVE
HCV AB SER QL: NONREACTIVE
HCV S/CO RATIO: 0.13 S/CO
HEPATITIS A IGG ANTIBODY: REACTIVE
HEPATITIS A IGG ANTIBODY: REACTIVE
HIV1+2 AB SPEC QL IA.RAPID: NONREACTIVE
HSV 1+2 IGG SER IA-IMP: POSITIVE
HSV 1+2 IGG SER IA-IMP: POSITIVE
HSV1 IGG SER QL: 49.5 INDEX
HSV2 IGG SER QL: >23.6 INDEX
INR PPP: 1.56 RATIO
KETONES URINE: NEGATIVE MG/DL
LEUKOCYTE ESTERASE URINE: ABNORMAL
MICROSCOPIC-UA: NORMAL
NITRITE URINE: NEGATIVE
PH URINE: 5.5
POTASSIUM SERPL-SCNC: 3.6 MMOL/L
PROT SERPL-MCNC: 7.1 G/DL
PROT UR-MCNC: 12 MG/DL
PROTEIN URINE: NEGATIVE MG/DL
PSA SERPL-MCNC: 0.22 NG/ML
PT BLD: 18.5 SEC
RED BLOOD CELLS URINE: 4 /HPF
REVIEW: NORMAL
RUBV IGG FLD-ACNC: 6.6 INDEX
RUBV IGG SER-IMP: POSITIVE
SARS-COV-2 AB SERPL IA-ACNC: >250 U/ML
SODIUM SERPL-SCNC: 140 MMOL/L
SPECIFIC GRAVITY URINE: 1.02
T GONDII AB SER-IMP: POSITIVE
T GONDII IGG SER QL: 48 IU/ML
T PALLIDUM AB SER QL IA: NEGATIVE
UROBILINOGEN URINE: 0.2 MG/DL
VZV AB TITR SER: POSITIVE
VZV IGG SER IF-ACNC: 1726 INDEX
WHITE BLOOD CELLS URINE: 5 /HPF

## 2023-06-13 ENCOUNTER — NON-APPOINTMENT (OUTPATIENT)
Age: 63
End: 2023-06-13

## 2023-06-13 LAB
DRUG ABUSE PANEL-9, SERUM: NORMAL
M TB IFN-G BLD-IMP: NEGATIVE
QUANTIFERON TB PLUS MITOGEN MINUS NIL: 4.36 IU/ML
QUANTIFERON TB PLUS NIL: 0.04 IU/ML
QUANTIFERON TB PLUS TB1 MINUS NIL: 0.01 IU/ML
QUANTIFERON TB PLUS TB2 MINUS NIL: 0.01 IU/ML
STRONGYLOIDES AB SER IA-ACNC: NEGATIVE

## 2023-06-14 ENCOUNTER — OUTPATIENT (OUTPATIENT)
Dept: OUTPATIENT SERVICES | Facility: HOSPITAL | Age: 63
LOS: 1 days | End: 2023-06-14
Payer: COMMERCIAL

## 2023-06-14 ENCOUNTER — RESULT REVIEW (OUTPATIENT)
Age: 63
End: 2023-06-14

## 2023-06-14 ENCOUNTER — APPOINTMENT (OUTPATIENT)
Age: 63
End: 2023-06-14

## 2023-06-14 PROCEDURE — 74183 MRI ABD W/O CNTR FLWD CNTR: CPT | Mod: 26

## 2023-06-19 ENCOUNTER — APPOINTMENT (OUTPATIENT)
Dept: INFECTIOUS DISEASE | Facility: CLINIC | Age: 63
End: 2023-06-19
Payer: COMMERCIAL

## 2023-06-19 VITALS
HEIGHT: 65 IN | BODY MASS INDEX: 28.36 KG/M2 | OXYGEN SATURATION: 99 % | DIASTOLIC BLOOD PRESSURE: 83 MMHG | TEMPERATURE: 97.8 F | SYSTOLIC BLOOD PRESSURE: 137 MMHG | HEART RATE: 93 BPM | WEIGHT: 170.25 LBS

## 2023-06-19 PROCEDURE — 99204 OFFICE O/P NEW MOD 45 MIN: CPT

## 2023-06-19 NOTE — ASSESSMENT
[FreeTextEntry1] : 61 yo male with EtOH cirrhosis presenting for pre-liver transplant ID evaluation.\par - CMV IgG+--warrants post txp ppx with valganciclovir per institutional protocol\par - HBV non-immune--advise three dose vaccination series with PMD with Engerix-B (0, 2, 6 months)--requesting to transition care to [facility] internal medicine \par - reports previously received pneumococcal vaccination \par - no ID contraindication to liver transplant \par \par rtc prn

## 2023-06-19 NOTE — REASON FOR VISIT
35-year-old white male well known to us from the past with last colonoscopy in 2014 with hemorrhoids only. He has had intermittent rectal bleeding since that time and it has been worse of late. There is enough blood to turn the water red. Bowel movements are described as normal and regular. He does not have tenesmus or rectal pain with a bowel movement. Appetite is good and his weight remains stable. There is no family history of colon cancer or polyps.
[Initial Evaluation] : an initial evaluation

## 2023-06-19 NOTE — HISTORY OF PRESENT ILLNESS
[FreeTextEntry1] : 63 yo male with EtOH cirrhosis presenting for pre-liver transplant ID evaluation. +EV; no ascites. Feels well today. Denies fever or chills or localizing infectious symptoms. He reports hospitalization 4/2023 for ?LLE compartment syndrome s/p fasciotomy. He endorses completing 10d course of a PO antibiotic at that time. He continues to follow with vascular surgeon. L calf incision has not completely closed--continues to have serous drainage; no purulence, surrounding erythema, edema, or pain. \par He reports another hospitalization at [facility] in 2022 for ?diarrheal illness. Patient reports he had a bacteria in his blood and received one week of IV antibiotics but is unable to elaborate further. \par No recent travel. From [place] originally--last there > 3 years ago. Works as a . Has a pet dog who is healthy and vaccinated.\par Believes he has received pneumococcal vaccination by PMD. \par

## 2023-06-19 NOTE — PHYSICAL EXAM
[General Appearance - Alert] : alert [General Appearance - In No Acute Distress] : in no acute distress [] : no respiratory distress [Auscultation Breath Sounds / Voice Sounds] : lungs were clear to auscultation bilaterally [Abdomen Soft] : soft [FreeTextEntry1] : L medial calf wound with serous drainage; no signs of cellulitis  [Oriented To Time, Place, And Person] : oriented to person, place, and time [Affect] : the affect was normal

## 2023-06-20 ENCOUNTER — APPOINTMENT (OUTPATIENT)
Dept: INTERNAL MEDICINE | Facility: CLINIC | Age: 63
End: 2023-06-20

## 2023-06-23 ENCOUNTER — NON-APPOINTMENT (OUTPATIENT)
Age: 63
End: 2023-06-23

## 2023-06-28 ENCOUNTER — NON-APPOINTMENT (OUTPATIENT)
Age: 63
End: 2023-06-28

## 2023-07-10 LAB
PETH 16:0/18:1: NEGATIVE NG/ML
PETH 16:0/18:2: NEGATIVE NG/ML
PETH COMMENTS: NORMAL

## 2023-07-16 ENCOUNTER — NON-APPOINTMENT (OUTPATIENT)
Age: 63
End: 2023-07-16

## 2023-07-17 ENCOUNTER — APPOINTMENT (OUTPATIENT)
Dept: HEPATOLOGY | Facility: CLINIC | Age: 63
End: 2023-07-17
Payer: MEDICAID

## 2023-07-17 ENCOUNTER — APPOINTMENT (OUTPATIENT)
Dept: HEART AND VASCULAR | Facility: CLINIC | Age: 63
End: 2023-07-17
Payer: MEDICAID

## 2023-07-17 ENCOUNTER — NON-APPOINTMENT (OUTPATIENT)
Age: 63
End: 2023-07-17

## 2023-07-17 VITALS
BODY MASS INDEX: 29.99 KG/M2 | OXYGEN SATURATION: 98 % | DIASTOLIC BLOOD PRESSURE: 67 MMHG | SYSTOLIC BLOOD PRESSURE: 114 MMHG | HEART RATE: 80 BPM | WEIGHT: 180 LBS | HEIGHT: 65 IN

## 2023-07-17 VITALS
SYSTOLIC BLOOD PRESSURE: 132 MMHG | WEIGHT: 179 LBS | HEART RATE: 99 BPM | BODY MASS INDEX: 29.82 KG/M2 | HEIGHT: 65 IN | TEMPERATURE: 98.7 F | OXYGEN SATURATION: 99 % | DIASTOLIC BLOOD PRESSURE: 74 MMHG

## 2023-07-17 PROCEDURE — 99204 OFFICE O/P NEW MOD 45 MIN: CPT | Mod: 25

## 2023-07-17 PROCEDURE — 99214 OFFICE O/P EST MOD 30 MIN: CPT

## 2023-07-17 PROCEDURE — 93000 ELECTROCARDIOGRAM COMPLETE: CPT | Mod: NC

## 2023-07-17 NOTE — PHYSICAL EXAM
[Normal S1, S2] : normal S1, S2 [No Rub] : no rub [No Gallop] : no gallop [Clear Lung Fields] : clear lung fields [Good Air Entry] : good air entry [Soft] : abdomen soft [Non Tender] : non-tender [de-identified] : 2/6 BRANDT MAYDAB [de-identified] : 1+ bilateral pitting edema to ankles, healing scar on left leg

## 2023-07-17 NOTE — HISTORY OF PRESENT ILLNESS
[Alcoholic Liver Disease] : Alcoholic Liver Disease [History of HCC] : No history of hepatocellular carcinoma [FreeTextEntry1] : 18 [FreeTextEntry2] : O [TextBox_42] : Wil Soriano is a 63 yo M with hx of AUD and ETOH cirr c/b  portal hypertension manifested by EV, no hx bleeding. No ascites. No HE. No HCC.\par Currently in liver transplant evaluation.  HCV Donor Acceptance.  Here for f/u.\par \par Last EGD was 2 yrs ago at which time he was put on Nadolol when he was diagnosed with cirr. Never followed up.\par Had hospitalization 6 mos ago at [facility] when went to hosp for alcohol withdrawal symptoms.\par \par Recent hosp at [facility] for vascular surgery for LLE compartment syndrome due to intramuscular hematoma with active extravasation of L gastrocnemius muscle, s/p fasciotomy on 4/23/23.\par Per notes, it is likely that hematoma which caused compartment syndrome is s/t severe thrombocytopenia r/t hx chronic alc use, cirr and liver dysfunction. Hyperbilirubinemia m/l indirect and likely s/t hemolysis and resorption of his hematoma and less likely d/t alc hepatitis or Acute liver failure as his cirr appears to be well compensated.\par \par Did not follow with a hepatologist, was referred after hospitalization/surgery to Dr. Baez who referred for OTLx eval. \par EV, nadolol\par \par Sober – 9 mos ago, inpatient for one month RPP, no rehab now\par \par MELD 18 [ABO O]\par \par LABS 6/7/23 (5/17/23)\par AST 57 (46)\par ALT 18 (14)\par * (163)\par *TB 3.5 (5.5)\par SCr 0.71 (0.7)\par INR 1.56 (1.7)\par \par ALB 3.1\par AFP 3.9\par \par LABS 5/17\par GGTP 35\par Ferritin 238.5\par Fe 218\par TIBC 224\par \par AFP 2.7\par \par STUDIES\par \par MRI 6/14/23\par IMPRESSION:\par 1. Advanced cirrhosis with trace ascites. No significant varices or portosystemic collaterals.\par 2. No HCC.\par \par ABDL US 4/25/23\par Vessels: Unremarkable visualized components of the abdl aorta and inf vena cava.\par Unremarkable visualized components of the hepatic veins.\par Normal hepatopedal flow within portal vein.\par Diminished flow velocity within main portal vein. \par \par Liver: heterogeneous liver with nodular surface contour. Findings c/w cirrhosis.\par Diffuse fatty infiltration of the liver. No masses. No e/f intrahep ductal diliatation.\par 12.3 cm sagittal dimension\par GB: Not distended, No pericholecystic fluid. No gallstones.\par Prox CBD normal in caliber and measures 4.2mm\par R Kidney: 12.2 cm sagittal dimension. Increased in echogenicity. R renal scarring. No hydro.\par Pancreas: Obscured by bowel gas artifact.\par \par IMPRESSION LTD study dt portable technique\par Cirrhotic liver. Heterogenous liver. Hep steatosis. No masses.\par Patent hepatopedal flow. \par Diminished flow velocity within the main portal vein.\par Echogenic R kidney c/w the medical renal disease. \par No bile duct dilatation.\par Obscured pancreas.\par \par NKA\par \par MEDICATIONS - NOT taking any medications\par taking LIVER WLLNESS function and detox\par \par had been discharged on\par Folic acid 1mg daily\par Nadolol 20mg daily - not taking now, ran out\par Pantoprazole 40mg daily\par \par PSH\par Fasciotomy, decompressive, LE, posterior compartment 4/23/23\par \par SOCIAL HX\par , wife Kylie, 3 children\par former , stopped working 3 months ago\par Smoking - never smoker\par ETOH - 6 pack/beer all day and hard liquor since age 14\par Drugs - denies\par Herbals - none\par Covid vax Pfizer x 3\par Coviid infection, 2020\par \par INTERIM HX\par feels 'good'\par abdl pain on/off at night when eats spicy foods, or when 'Im angry' - stress related\par Denies NVD, CP, SOB, f/c\par BM 3-4 x/day, no rectal blood\par denies drinking alcohol, last drink 9 mos ago, not attending rehab now, was in past 2 yrs ago, goes to education centers to learn experieces of others

## 2023-07-17 NOTE — END OF VISIT
[FreeTextEntry3] : Attending note\par I have seen and examined patient at bedside. I agree with hx, ROS, physical examination, imp/suggestions as written by Ms. Lisa Desir NP. Please see my note.\par

## 2023-07-17 NOTE — REASON FOR VISIT
[FreeTextEntry1] : 62 year old man with h/o alcohol use disorder and liver cirrhosis decompensated by esophageal varices, left leg compartment syndrome in setting of severe thrombocytopenia s/p fasciotomy 2023 here for pre-liver transplant cardiac evaluation. Patient states prior to leg compartment syndrome he was able to walk unlimited distances on flat ground without symptoms. Currently walks around 2 blocks and climbs subway steps though mobility limited by leg pain. Denies any history of syncope, denies chest pain, dyspnea on exertion, palpitations, orthopnea, or PND. No personal history of CVD. \par \par SH\par From [facility], immigrated \par Never smoker\par Denies illicits, OTC medications, herbal supplements\par \par FH\par 3 children, all healthy\par Father  "old age" at 102 \par Mother  "old age" at 80s \par

## 2023-07-17 NOTE — PHYSICAL EXAM
[Alert] : alert [No Acute Distress] : no acute distress [PERRLA] : pupils equal, round, reactive to light and accommodation [Clear to Auscultation] : lungs were clear to auscultation bilaterally [Normal Rate] : normal rate [No Deformities] : no deformities [Jaundice] : no jaundice [Asterixis] : no asterixis [FreeTextEntry1] : L leg mildly edematous s/p vascular surgeon, 2+ pedal pulses

## 2023-07-17 NOTE — ASSESSMENT
[FreeTextEntry1] : 62 year old man with h/o alcohol use disorder and liver cirrhosis decompensated by esophageal varices, left leg compartment syndrome in setting of severe thrombocytopenia s/p fasciotomy 4/2023 here for pre-liver transplant cardiac evaluation. No personal or familial high risk features. Not currently on any medications. Asymptomatic however limited mobility due to left leg compartment syndrome s/p fasciotomy, pending ?revision surgery. Pancytopenic, Hgb 9.7 and plt 51 likely due to sequestration and chronic alcohol use with normal Cr. \par - TTE and dobutamine stress echo\par - Lipid panel for risk stratification, A1c 5.3 6/2023\par

## 2023-07-17 NOTE — ASSESSMENT
[FreeTextEntry1] : \par Wil Soriano is a 63 yo M with hx of AUD and ETOH cirr c/b portal hypertension manifested by EV, no hx bleeding. No ascites. No HE. No HCC.\par Currently in liver transplant evaluation.  HCV Donor Acceptance YES.  Here for f/u.\par \par HCC screening, MRI 6/14 w/o HCC, DUE DEC - Imaging reviewed with pt\par LABS today - MELD\par \par EVAL W/U PENDING:\par Cardiology, Dr. Weston today 7/17\par EGD/COLON scd w Dr. Flores 10/25 - will move up  to Aug 3, prep instructions given\par Dental, 6/21 - does not have name - need Clearance letter\par PCP referral Dr. Vivar 6/23 (did he go, needs HBV vax, see below)\par [facility] recs - Psychosocial clearance is pending engagement in an RPP program. \par \par EVAL W/U COMPLETED\par ID Dr. Lobo, cleared 6/19/23 \par -HBV Nonimmune - was recommended three dose vaccination with PCM with Engerix B (0,2,6 mos)\par \par Cont routine f/u with vascular surgeon - \par stop herbal meds\par \par RTC 6 weeks

## 2023-07-19 ENCOUNTER — APPOINTMENT (OUTPATIENT)
Dept: HEART AND VASCULAR | Facility: CLINIC | Age: 63
End: 2023-07-19
Payer: MEDICAID

## 2023-07-19 ENCOUNTER — NON-APPOINTMENT (OUTPATIENT)
Age: 63
End: 2023-07-19

## 2023-07-19 VITALS
BODY MASS INDEX: 29.66 KG/M2 | SYSTOLIC BLOOD PRESSURE: 130 MMHG | HEART RATE: 64 BPM | WEIGHT: 178 LBS | HEIGHT: 65 IN | DIASTOLIC BLOOD PRESSURE: 80 MMHG

## 2023-07-19 DIAGNOSIS — Z78.9 OTHER SPECIFIED HEALTH STATUS: ICD-10-CM

## 2023-07-19 DIAGNOSIS — M79.A29 NONTRAUMATIC COMPARTMENT SYNDROME OF UNSPECIFIED LOWER EXTREMITY: ICD-10-CM

## 2023-07-19 DIAGNOSIS — Z87.898 PERSONAL HISTORY OF OTHER SPECIFIED CONDITIONS: ICD-10-CM

## 2023-07-19 PROCEDURE — 93306 TTE W/DOPPLER COMPLETE: CPT

## 2023-07-19 PROCEDURE — ZZZZZ: CPT

## 2023-07-19 PROCEDURE — 93000 ELECTROCARDIOGRAM COMPLETE: CPT | Mod: NC

## 2023-07-19 PROCEDURE — 99214 OFFICE O/P EST MOD 30 MIN: CPT | Mod: 25

## 2023-07-19 NOTE — DISCUSSION/SUMMARY
[FreeTextEntry1] : Pre cardiac evaluation -  Pt with normal LV function. Pt unable to walk and climb treadmill due to recent fasciotomy. Non-walking nuclear stress test ordered to rule out underling ischemia.\par Cardiac Murmur- Stable; no further intervention at this time (see echo).\par Maintain a low caloric, low sodium, low cholesterol diet. Dietary counseling given, diet and exercise discussed in detail.\par \par \par

## 2023-07-19 NOTE — PHYSICAL EXAM

## 2023-07-19 NOTE — END OF VISIT
[Time Spent: ___ minutes] : I have spent [unfilled] minutes of time on the encounter. [FreeTextEntry3] : All medical record entries made by the Scribe were at my, Dr. Med Palumbo, direction and personally dictated by me on 07/19/2023. I have reviewed the chart and agree that the record accurately reflects my personal performance of the history, physical exam, assessment and plan. I have also personally directed, reviewed, and agreed with the chart.

## 2023-07-19 NOTE — HISTORY OF PRESENT ILLNESS
[FreeTextEntry1] : Pt comes in for a cardiac evaluation for a liver transplant. \par Cardiac murmur - Echo ordered to assess LV function/possible valvular heart disease.\par Denies Chest Pain, SOB, Dizziness, Leg edema, Orthopnea, PND, Palpitations, Syncope, SIMMS, Diaphoresis.\par \par

## 2023-07-23 ENCOUNTER — FORM ENCOUNTER (OUTPATIENT)
Age: 63
End: 2023-07-23

## 2023-07-25 ENCOUNTER — NON-APPOINTMENT (OUTPATIENT)
Age: 63
End: 2023-07-25

## 2023-07-25 LAB
AFP-TM SERPL-MCNC: 3 NG/ML
ALBUMIN SERPL ELPH-MCNC: 2.9 G/DL
ALP BLD-CCNC: 221 U/L
ALT SERPL-CCNC: 21 U/L
ANION GAP SERPL CALC-SCNC: 7 MMOL/L
AST SERPL-CCNC: 51 U/L
BILIRUB SERPL-MCNC: 4.4 MG/DL
BUN SERPL-MCNC: 9 MG/DL
CALCIUM SERPL-MCNC: 9.1 MG/DL
CHLORIDE SERPL-SCNC: 106 MMOL/L
CO2 SERPL-SCNC: 25 MMOL/L
CREAT SERPL-MCNC: 0.86 MG/DL
EGFR: 97 ML/MIN/1.73M2
GLUCOSE SERPL-MCNC: 102 MG/DL
INR PPP: 1.66 RATIO
PETH 16:0/18:1: NEGATIVE NG/ML
PETH 16:0/18:2: NEGATIVE NG/ML
PETH COMMENTS: NORMAL
POTASSIUM SERPL-SCNC: 4.6 MMOL/L
PROT SERPL-MCNC: 6.7 G/DL
PT BLD: 19.5 SEC
SODIUM SERPL-SCNC: 139 MMOL/L

## 2023-07-26 ENCOUNTER — APPOINTMENT (OUTPATIENT)
Dept: HEART AND VASCULAR | Facility: CLINIC | Age: 63
End: 2023-07-26
Payer: MEDICAID

## 2023-07-26 PROCEDURE — 96374 THER/PROPH/DIAG INJ IV PUSH: CPT | Mod: 59

## 2023-07-26 PROCEDURE — A9500: CPT

## 2023-07-26 PROCEDURE — 78452 HT MUSCLE IMAGE SPECT MULT: CPT

## 2023-07-26 PROCEDURE — 93015 CV STRESS TEST SUPVJ I&R: CPT

## 2023-07-31 ENCOUNTER — NON-APPOINTMENT (OUTPATIENT)
Age: 63
End: 2023-07-31

## 2023-08-02 ENCOUNTER — TRANSCRIPTION ENCOUNTER (OUTPATIENT)
Age: 63
End: 2023-08-02

## 2023-08-03 ENCOUNTER — OUTPATIENT (OUTPATIENT)
Dept: OUTPATIENT SERVICES | Facility: HOSPITAL | Age: 63
LOS: 1 days | Discharge: HOME | End: 2023-08-03
Payer: COMMERCIAL

## 2023-08-03 ENCOUNTER — APPOINTMENT (OUTPATIENT)
Dept: HEPATOLOGY | Facility: HOSPITAL | Age: 63
End: 2023-08-03

## 2023-08-03 ENCOUNTER — RESULT REVIEW (OUTPATIENT)
Age: 63
End: 2023-08-03

## 2023-08-03 VITALS
DIASTOLIC BLOOD PRESSURE: 55 MMHG | OXYGEN SATURATION: 99 % | TEMPERATURE: 99 F | HEIGHT: 65 IN | HEART RATE: 55 BPM | SYSTOLIC BLOOD PRESSURE: 155 MMHG | RESPIRATION RATE: 14 BRPM | WEIGHT: 182.98 LBS

## 2023-08-03 PROCEDURE — 88305 TISSUE EXAM BY PATHOLOGIST: CPT

## 2023-08-03 PROCEDURE — 45380 COLONOSCOPY AND BIOPSY: CPT

## 2023-08-03 PROCEDURE — 88305 TISSUE EXAM BY PATHOLOGIST: CPT | Mod: 26

## 2023-08-03 PROCEDURE — 43235 EGD DIAGNOSTIC BRUSH WASH: CPT

## 2023-08-03 PROCEDURE — 45380 COLONOSCOPY AND BIOPSY: CPT | Mod: PT

## 2023-08-03 RX ORDER — SODIUM CHLORIDE 9 MG/ML
1000 INJECTION, SOLUTION INTRAVENOUS
Refills: 0 | Status: DISCONTINUED | OUTPATIENT
Start: 2023-08-03 | End: 2023-08-03

## 2023-08-04 ENCOUNTER — OUTPATIENT (OUTPATIENT)
Dept: OUTPATIENT SERVICES | Facility: HOSPITAL | Age: 63
LOS: 1 days | End: 2023-08-04

## 2023-08-04 DIAGNOSIS — K74.60 UNSPECIFIED CIRRHOSIS OF LIVER: ICD-10-CM

## 2023-08-04 DIAGNOSIS — Z01.818 ENCOUNTER FOR OTHER PREPROCEDURAL EXAMINATION: ICD-10-CM

## 2023-08-04 LAB — SURGICAL PATHOLOGY STUDY: SIGNIFICANT CHANGE UP

## 2023-08-10 ENCOUNTER — APPOINTMENT (OUTPATIENT)
Dept: HEART AND VASCULAR | Facility: CLINIC | Age: 63
End: 2023-08-10

## 2023-08-17 ENCOUNTER — NON-APPOINTMENT (OUTPATIENT)
Age: 63
End: 2023-08-17

## 2023-09-06 ENCOUNTER — APPOINTMENT (OUTPATIENT)
Dept: HEPATOLOGY | Facility: CLINIC | Age: 63
End: 2023-09-06
Payer: MEDICAID

## 2023-09-06 VITALS
HEIGHT: 65 IN | SYSTOLIC BLOOD PRESSURE: 130 MMHG | WEIGHT: 180 LBS | DIASTOLIC BLOOD PRESSURE: 73 MMHG | OXYGEN SATURATION: 99 % | TEMPERATURE: 98.1 F | BODY MASS INDEX: 29.99 KG/M2 | RESPIRATION RATE: 14 BRPM | HEART RATE: 89 BPM

## 2023-09-06 DIAGNOSIS — Z12.11 ENCOUNTER FOR SCREENING FOR MALIGNANT NEOPLASM OF COLON: ICD-10-CM

## 2023-09-06 PROCEDURE — 99214 OFFICE O/P EST MOD 30 MIN: CPT

## 2023-09-06 RX ORDER — POLYETHYLENE GLYCOL 3350 17 G/17G
17 POWDER, FOR SOLUTION ORAL
Qty: 238 | Refills: 0 | Status: DISCONTINUED | COMMUNITY
Start: 2023-07-28 | End: 2023-09-06

## 2023-09-10 RX ORDER — FUROSEMIDE 20 MG/1
20 TABLET ORAL DAILY
Qty: 30 | Refills: 1 | Status: ACTIVE | COMMUNITY
Start: 2023-09-10 | End: 1900-01-01

## 2023-09-10 RX ORDER — CARVEDILOL 3.12 MG/1
3.12 TABLET, FILM COATED ORAL TWICE DAILY
Qty: 60 | Refills: 5 | Status: DISCONTINUED | COMMUNITY
Start: 2023-08-03 | End: 2023-09-10

## 2023-09-10 RX ORDER — SPIRONOLACTONE 50 MG/1
50 TABLET ORAL DAILY
Qty: 30 | Refills: 1 | Status: ACTIVE | COMMUNITY
Start: 2023-09-10 | End: 1900-01-01

## 2023-09-10 NOTE — HISTORY OF PRESENT ILLNESS
[TextBox_42] : MELD-Na: 18   Blood Type: O   Cause(s) of Liver Disease: Alcoholic Liver Disease   Hepatocelluar Carcinoma: No history of hepatocellular carcinoma Wil Soriano is a 64 yo M with hx of AUD and ETOH cirr c/b portal hypertension manifested by EV, no hx bleeding. No ascites. No HE. No HCC. Currently in liver transplant evaluation. HCV Donor Acceptance. Here for f/u.  Last EGD was 2 yrs ago at which time he was put on Nadolol when he was diagnosed with cirr. Never followed up. Had hospitalization 6 mos ago at [facility] when went to hosp for alcohol withdrawal symptoms.  Recent hosp at [facility] for vascular surgery for LLE compartment syndrome due to intramuscular hematoma with active extravasation of L gastrocnemius muscle, s/p fasciotomy on 4/23/23.  Per notes, it is likely that hematoma which caused compartment syndrome is s/t severe thrombocytopenia r/t hx chronic alc use, cirr and liver dysfunction. Hyperbilirubinemia m/l indirect and likely s/t hemolysis and resorption of his hematoma and less likely d/t alc hepatitis or Acute liver failure as his cirr appears to be well compensated.  Did not follow with a hepatologist, was referred after hospitalization/surgery to Dr. Baez who referred for OTLx eval.  EV, nadolol Sober - 10 mos ago, inpatient for one month RPP, no rehab now  MELD 18 [ABO O]  LABS 7/17/23 (6/7) MELD 18 INR 1.66 (1.56) hg 9.9 (9.7) PLT 48 (41) AFP 3 (3.9) SCr 0.86 (0.71) Tb 4.4 (3.5) AST 51 (57) ALT 21 (18)  (234) PEth Negative  STUDIES  EGD 8/3/23 Normal duodenum. Varices in the lower third of the esophagus. Congestion, petechiae and mosaic mucosal pattern in the stomach.  COLON 8/3/23  BBPS 9 Prominent ICV (Biopsy). 5mm rectal polyp overlying rectal varix v large hemorrhoid. Polypectomy was not performed due to high risk of bleeding.  PATH 1. Prominent ileocecal valve: - Colonic mucosa within normal limits Repeat 3 yrs  MRI 6/14/23 1. Advanced cirrhosis with trace ascites. No significant varices or portosystemic collaterals. 2. No HCC.  ABDL US 4/25/23 Vessels: Unremarkable visualized components of the abdl aorta and inf vena cava. Unremarkable visualized components of the hepatic veins. Normal hepatopedal flow within portal vein. Diminished flow velocity within main portal vein. Liver: heterogeneous liver with nodular surface contour. Findings c/w cirrhosis. Diffuse fatty infiltration of the liver. No masses. No e/f intrahep ductal diliatation. 12.3 cm sagittal dimension GB: Not distended, No pericholecystic fluid. No gallstones. Prox CBD normal in caliber and measures 4.2mm R Kidney: 12.2 cm sagittal dimension. Increased in echogenicity. R renal scarring. No hydro. Pancreas: Obscured by bowel gas artifact.  IMPRESSION LTD study dt portable technique Cirrhotic liver. Heterogenous liver. Hep steatosis. No masses. Patent hepatopedal flow. Diminished flow velocity within the main portal vein. Echogenic R kidney c/w the medical renal disease. No bile duct dilatation. Obscured pancreas.  TTE 7/19/23 CONCLUSIONS: 1. Normal left ventricular cavity size. The left ventricular wall thickness is normal. The left ventricular systolic function is normal with an ejection fraction visually estimated at 55 to 60 %. There are no regional wall motion abnormalities seen. 2. The left atrium is mildly dilated. 3. Mild to moderate mitral regurgitation. 4. Mild-moderate tricuspid regurgitation. 5. Estimated pulmonary artery systolic pressure is 54 mmHg. 6. Fibrocalcific aortic valve sclerosis without stenosis. NKA  MEDICATIONS - NOT taking any medications  taking LIVER WLLNESS function and deto had been discharged on: Folic acid 1mg daily Nadolol 20mg daily - not taking now, ran out Pantoprazole 40mg daily  PSH Fasciotomy, decompressive, LE, posterior compartment 4/23/23  SOCIAL HX , wife Kylie, 3 children former , stopped working 3 months ago Smoking - never smoker ETOH - 6 pack/beer all day and hard liquor since age 14 Drugs - denies Herbals - none Covid vax Pfizer x 3 Coviid infection, 2020  INTERIM HX

## 2023-09-10 NOTE — END OF VISIT
[FreeTextEntry3] : Attending note  I have seen and examined patient at bedside. I agree with hx, ROS, physical examination, imp/suggestions as written by Ms. Lisa Desir NP. Please see my note.  His main problem is leg edema  No other symptoms  No confusion  No chest pain or SOB VS stable  No ascites  Bilateral 2 plus edema  Imp/suggestions: Cirrhosis related to alcohol use disorder Transplant work-up in progress Lower extremity edema which requires diuretics. Patient feeling dizzy on Coreg and has evidence of fluid overload. I we will switch the Coreg to nadolol 20 mg at night and start diuretics.

## 2023-09-10 NOTE — ASSESSMENT
[FreeTextEntry1] : Wil Soriano is a 62 yo M with hx of AUD and ETOH cirr c/b portal hypertension manifested by EV, no hx bleeding. No ascites. No HE. No HCC.  Currently in liver transplant evaluation. Here for f/u. HCV Donor Acceptance YES.  HCC screening, MRI 6/14 w/o HCC, DUE DEC  LABS today - MELD  EVAL W/U PENDING: Cardiology, Dr. Weston, pulm htn, PASP 54, pending TTE with [facility], Appt scd 8/24/23 with Dr. Rajesh Reynolds, [address] [place] [address] [phone]. need Clearance letter PCP referral Dr. Vivar 6/23 (did he go, needs HBV vax, see below) [facility] recs - Psychosocial clearance is pending engagement in an RPP program.  EVAL W/U COMPLETED ID Dr. Lobo, cleared 6/19/23 EGD/COILON 8/3 EGD EV - Esophagus Protruding lesions 2 cords of varices (5mm) in the lower third of the esophagus without red laina sign or stigmata of recent bleeding COLON 5mm rectal polyp not removed, nol colonic mucosa -HBV Nonimmune - was recommended three dose vaccination with PCM with Engerix B (0,2,6 mos)  Cont routine f/u with vascular surgeon - stop herbal meds  RTC 6 weeks.

## 2023-09-20 ENCOUNTER — APPOINTMENT (OUTPATIENT)
Dept: HEPATOLOGY | Facility: CLINIC | Age: 63
End: 2023-09-20

## 2024-01-09 ENCOUNTER — NON-APPOINTMENT (OUTPATIENT)
Age: 64
End: 2024-01-09

## 2024-01-24 ENCOUNTER — NON-APPOINTMENT (OUTPATIENT)
Age: 64
End: 2024-01-24

## 2024-01-24 ENCOUNTER — LABORATORY RESULT (OUTPATIENT)
Age: 64
End: 2024-01-24

## 2024-01-24 ENCOUNTER — APPOINTMENT (OUTPATIENT)
Dept: HEART AND VASCULAR | Facility: CLINIC | Age: 64
End: 2024-01-24
Payer: MEDICAID

## 2024-01-24 VITALS
WEIGHT: 182 LBS | BODY MASS INDEX: 30.32 KG/M2 | RESPIRATION RATE: 14 BRPM | SYSTOLIC BLOOD PRESSURE: 140 MMHG | HEIGHT: 65 IN | HEART RATE: 82 BPM | DIASTOLIC BLOOD PRESSURE: 90 MMHG

## 2024-01-24 DIAGNOSIS — R01.1 CARDIAC MURMUR, UNSPECIFIED: ICD-10-CM

## 2024-01-24 PROCEDURE — 93000 ELECTROCARDIOGRAM COMPLETE: CPT

## 2024-01-24 PROCEDURE — G2211 COMPLEX E/M VISIT ADD ON: CPT

## 2024-01-24 PROCEDURE — 99214 OFFICE O/P EST MOD 30 MIN: CPT | Mod: 25

## 2024-01-24 PROCEDURE — 36415 COLL VENOUS BLD VENIPUNCTURE: CPT

## 2024-01-24 NOTE — DISCUSSION/SUMMARY
[FreeTextEntry1] : Pre-transplant evaluation for liver transplant - Nuclear stress test ordered to rule out underlying ischemia in preparation for liver transplant. Will coordinate with liver transplant team.  Cardiac murmur - Stable; no further intervention at this time (see echo). Blood work drawn. Maintain a low caloric, low sodium, low cholesterol diet, Dietary counseling given, diet and exercise discussed in detail.

## 2024-01-24 NOTE — END OF VISIT
[FreeTextEntry3] : All medical record entries made by the Scribe were at my, Dr. Med Palumbo, direction and personally dictated by me on -01/24/2024 - I have reviewed the chart and agree that the record accurately reflects my personal performance of the history, physical exam, assessment and plan. I have also personally directed, reviewed and agreed with the chart.

## 2024-01-24 NOTE — PHYSICAL EXAM
[Well Developed] : well developed [Well Nourished] : well nourished [No Acute Distress] : no acute distress [Normal Conjunctiva] : normal conjunctiva [Normal Venous Pressure] : normal venous pressure [Normal] : normal S1, S2, no murmur, no rub, no gallop [Normal S1, S2] : normal S1, S2 [No Rub] : no rub [No Gallop] : no gallop [Murmur] : murmur [Clear Lung Fields] : clear lung fields [Good Air Entry] : good air entry [No Respiratory Distress] : no respiratory distress  [Soft] : abdomen soft [Non Tender] : non-tender [No Masses/organomegaly] : no masses/organomegaly [Normal Bowel Sounds] : normal bowel sounds [Normal Gait] : normal gait [No Edema] : no edema [No Cyanosis] : no cyanosis [No Clubbing] : no clubbing [No Varicosities] : no varicosities [No Rash] : no rash [No Skin Lesions] : no skin lesions [Moves all extremities] : moves all extremities [No Focal Deficits] : no focal deficits [Normal Speech] : normal speech [Alert and Oriented] : alert and oriented [Normal memory] : normal memory [de-identified] :  2/6 BRANDT --> Axilla
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
## 2024-01-25 ENCOUNTER — APPOINTMENT (OUTPATIENT)
Dept: HEPATOLOGY | Facility: CLINIC | Age: 64
End: 2024-01-25
Payer: MEDICAID

## 2024-01-25 VITALS
OXYGEN SATURATION: 100 % | WEIGHT: 182 LBS | HEART RATE: 77 BPM | HEIGHT: 65 IN | TEMPERATURE: 98.7 F | SYSTOLIC BLOOD PRESSURE: 150 MMHG | BODY MASS INDEX: 30.32 KG/M2 | RESPIRATION RATE: 17 BRPM | DIASTOLIC BLOOD PRESSURE: 79 MMHG

## 2024-01-25 PROCEDURE — 99214 OFFICE O/P EST MOD 30 MIN: CPT

## 2024-02-01 LAB
25(OH)D3 SERPL-MCNC: 17.1 NG/ML
AFP-TM SERPL-MCNC: 4.6 NG/ML
ALBUMIN SERPL ELPH-MCNC: 2.9 G/DL
ALP BLD-CCNC: 375 U/L
ALT SERPL-CCNC: 40 U/L
ANION GAP SERPL CALC-SCNC: 10 MMOL/L
AST SERPL-CCNC: 92 U/L
BASOPHILS # BLD AUTO: 0.03 K/UL
BASOPHILS NFR BLD AUTO: 1 %
BILIRUB SERPL-MCNC: 3.1 MG/DL
BUN SERPL-MCNC: 9 MG/DL
CALCIUM SERPL-MCNC: 8 MG/DL
CHLORIDE SERPL-SCNC: 107 MMOL/L
CHOLEST SERPL-MCNC: 213 MG/DL
CO2 SERPL-SCNC: 23 MMOL/L
CREAT SERPL-MCNC: 0.75 MG/DL
EGFR: 101 ML/MIN/1.73M2
EOSINOPHIL # BLD AUTO: 0.13 K/UL
EOSINOPHIL NFR BLD AUTO: 4.4 %
ESTIMATED AVERAGE GLUCOSE: 94 MG/DL
FOLATE SERPL-MCNC: 14.1 NG/ML
GLUCOSE SERPL-MCNC: 120 MG/DL
HBA1C MFR BLD HPLC: 4.9 %
HCT VFR BLD CALC: 34.1 %
HDLC SERPL-MCNC: 63 MG/DL
HGB BLD-MCNC: 11.4 G/DL
IMM GRANULOCYTES NFR BLD AUTO: 0.3 %
INR PPP: 1.34 RATIO
LDLC SERPL CALC-MCNC: 121 MG/DL
LYMPHOCYTES # BLD AUTO: 1.12 K/UL
LYMPHOCYTES NFR BLD AUTO: 37.8 %
MAN DIFF?: NORMAL
MCHC RBC-ENTMCNC: 33.4 GM/DL
MCHC RBC-ENTMCNC: 35.4 PG
MCV RBC AUTO: 105.9 FL
MONOCYTES # BLD AUTO: 0.35 K/UL
MONOCYTES NFR BLD AUTO: 11.8 %
NEUTROPHILS # BLD AUTO: 1.32 K/UL
NEUTROPHILS NFR BLD AUTO: 44.7 %
NONHDLC SERPL-MCNC: 150 MG/DL
PETH 16:0/18:1: >400 NG/ML
PETH 16:0/18:2: >400 NG/ML
PETH COMMENTS: NORMAL
PLATELET # BLD AUTO: 29 K/UL
POTASSIUM SERPL-SCNC: 4.3 MMOL/L
PROT SERPL-MCNC: 6.7 G/DL
PSA FREE FLD-MCNC: 18 %
PSA FREE SERPL-MCNC: 0.08 NG/ML
PSA SERPL-MCNC: 0.43 NG/ML
PT BLD: 15 SEC
RBC # BLD: 3.22 M/UL
RBC # FLD: 18.9 %
SODIUM SERPL-SCNC: 139 MMOL/L
T3 SERPL-MCNC: 71 NG/DL
T3FREE SERPL-MCNC: 2.12 PG/ML
T3RU NFR SERPL: 0.8 TBI
T4 FREE SERPL-MCNC: 0.7 NG/DL
T4 SERPL-MCNC: 3.8 UG/DL
TRIGL SERPL-MCNC: 165 MG/DL
TSH SERPL-ACNC: 4.1 UIU/ML
VIT B12 SERPL-MCNC: 1649 PG/ML
WBC # FLD AUTO: 2.96 K/UL

## 2024-02-12 ENCOUNTER — APPOINTMENT (OUTPATIENT)
Dept: HEPATOLOGY | Facility: CLINIC | Age: 64
End: 2024-02-12
Payer: MEDICAID

## 2024-02-12 VITALS
DIASTOLIC BLOOD PRESSURE: 84 MMHG | OXYGEN SATURATION: 98 % | SYSTOLIC BLOOD PRESSURE: 158 MMHG | BODY MASS INDEX: 29.99 KG/M2 | HEART RATE: 90 BPM | WEIGHT: 180 LBS | HEIGHT: 65 IN | TEMPERATURE: 100 F

## 2024-02-12 PROCEDURE — 99214 OFFICE O/P EST MOD 30 MIN: CPT

## 2024-02-13 LAB
AFP-TM SERPL-MCNC: 4.3 NG/ML
ALBUMIN SERPL ELPH-MCNC: 3 G/DL
ALP BLD-CCNC: 343 U/L
ALT SERPL-CCNC: 42 U/L
ANION GAP SERPL CALC-SCNC: 11 MMOL/L
AST SERPL-CCNC: 112 U/L
BILIRUB SERPL-MCNC: 3.6 MG/DL
BUN SERPL-MCNC: 8 MG/DL
CALCIUM SERPL-MCNC: 8 MG/DL
CHLORIDE SERPL-SCNC: 106 MMOL/L
CO2 SERPL-SCNC: 22 MMOL/L
CREAT SERPL-MCNC: 0.67 MG/DL
EGFR: 104 ML/MIN/1.73M2
GLUCOSE SERPL-MCNC: 125 MG/DL
HCT VFR BLD CALC: 33.4 %
HGB BLD-MCNC: 11.4 G/DL
INR PPP: 1.39 RATIO
MCHC RBC-ENTMCNC: 34.1 GM/DL
MCHC RBC-ENTMCNC: 34.9 PG
MCV RBC AUTO: 102.1 FL
PLATELET # BLD AUTO: 24 K/UL
POTASSIUM SERPL-SCNC: 3.8 MMOL/L
PROT SERPL-MCNC: 7.2 G/DL
PT BLD: 15.6 SEC
RBC # BLD: 3.27 M/UL
RBC # FLD: 19.9 %
SODIUM SERPL-SCNC: 139 MMOL/L
WBC # FLD AUTO: 2.71 K/UL

## 2024-02-22 LAB
PETH 16:0/18:1: >400 NG/ML
PETH 16:0/18:2: >400 NG/ML
PETH COMMENTS: NORMAL

## 2024-02-24 NOTE — ASSESSMENT
[FreeTextEntry1] :  64 yo M with hx of AUD and ETOH cirr c/b portal hypertension manifested by EV, no hx bleeding. No ascites. No HE. No HCC.  Currently in liver transplant evaluation. Here for f/u. HCV Donor Acceptance YES.  #HCC screening, MRI 6/14 w/o HCC- DUE orderd AFP/ US q6 months  #Leg edema (no ascites) resolved leg edema was on lasix 20/Aldactone 50- pt not taking  #EV, -egd done 2 years ago -c/w coreg  #EVAL W/U PENDING: Cardiology, Dr. Weston, pulm htn, PASP 54, pending TTE with [facility] PCP referral Dr. Vivar 6/23 (did he go, needs HBV vax, see below) [facility] recs - Psychosocial clearance is pending engagement in an RPP program.  EVAL W/U COMPLETED ID Dr. Lobo, cleared 6/19/23 EGD/COILON 8/3 EGD EV - Esophagus Protruding lesions 2 cords of varices (5mm) in the lower third of the esophagus without red laina sign or stigmata of recent bleeding COLON 5mm rectal polyp not removed, nol colonic mucosa -HBV Nonimmune - was recommended three dose vaccination with PCM with Engerix B (0,2,6 mos)  Cont routine f/u with vascular surgeon  stop herbal meds.  RTC in 3 weeks with blood work  Discussed with DR. latif

## 2024-02-24 NOTE — HISTORY OF PRESENT ILLNESS
[FreeTextEntry1] : [place]ese: [id number]   HPI 62 yo M with hx of AUD and ETOH cirr c/b portal hypertension manifested by EV, no hx bleeding. No ascites. No HE. No HCC. He is here for a follow up. He has been evaluated for liver transplantation is September and is on the list. He was in [place] recently and was drinking etoh- occasionally while he was there.  His initial liver dx was secondary to etoh.  Today Pt states that he is doing well and has no active complaints. still drinks etoh occasionally. denies any BRBPR or hematemesis. cards, Dr. Weston - needs [facility] to eval pulm htn/clearance Dental clearance HBV vax  Last EGD was 2 yrs ago at which time he was put on Nadolol when he was diagnosed with cirrhosis. Had hospitalization  at [facility] 1 year ago when went to hosp for alcohol withdrawal symptoms. Recent hosp at [facility] for vascular surgery for LLE compartment syndrome due to intramuscular hematoma with active extravasation of L gastrocnemius muscle, s/p fasciotomy on 4/23/23.  Medications carvedilol 6.25  MELD 14 [ABO O] from 18  LABS 1/24/24 INR 1.3 (1.66) PLT 29 (48) AFP 4.6 (3.) SCr 0.86 (0.71) Tb 4.4 (3.5) AST 92 ALT 40  PEth Negative july 2023  STUDIES EGD 8/3/23 Normal duodenum. Varices in the lower third of the esophagus. Congestion, petechiae and mosaic mucosal pattern in the stomach.  COLON 8/3/23 BBPS 9 Prominent ICV (Biopsy). 5mm rectal polyp overlying rectal varix v large hemorrhoid. Polypectomy was not performed due to high risk of bleeding. PATH 1. Prominent ileocecal valve: - Colonic mucosa within normal limits Repeat 3 yrs  MRI 6/14/23 1. Advanced cirrhosis with trace ascites. No significant varices or portosystemic collaterals. 2. No HCC.  ABDL US 4/25/23 Vessels: Unremarkable visualized components of the abdl aorta and inf vena cava. Unremarkable visualized components of the hepatic veins. Normal hepatopedal flow within portal vein. Diminished flow velocity within main portal vein. Liver: heterogeneous liver with nodular surface contour. Findings c/w cirrhosis. Diffuse fatty infiltration of the liver. No masses. No e/f intrahep ductal dilatation. 12.3 cm sagittal dimension GB: Not distended, No pericholecystic fluid. No gallstones. Prox CBD normal in caliber and measures 4.2mm R Kidney: 12.2 cm sagittal dimension. Increased in echogenicity. R renal scarring. No hydro. Pancreas: Obscured by bowel gas artifact.  IMPRESSION LTD study dt portable technique Cirrhotic liver. Heterogenous liver. Hep steatosis. No masses. Patent hepatopedal flow. Diminished flow velocity within the main portal vein. Echogenic R kidney c/w the medical renal disease. No bile duct dilatation. Obscured pancreas.  TTE 7/19/23 CONCLUSIONS: 1. Normal left ventricular cavity size. The left ventricular wall thickness is normal. The left ventricular systolic function is normal with an ejection fraction visually estimated at 55 to 60 %. There are no regional wall motion abnormalities seen. 2. The left atrium is mildly dilated. 3. Mild to moderate mitral regurgitation. 4. Mild-moderate tricuspid regurgitation. 5. Estimated pulmonary artery systolic pressure is 54 mmHg. 6. Fibrocalcific aortic valve sclerosis without stenosis. NKA  PSH Fasciotomy, decompressive, LE, posterior compartment 4/23/23  SOCIAL HX , wife Kylie, 3 children. former , stopped working 3 months ago Smoking - never smoker ETOH - 6 pack/beer all day and hard liquor since age 14 Drugs - denies Herbals - none Covid vax Pfizer x 3 Coviid infection, 2020

## 2024-02-24 NOTE — END OF VISIT
[FreeTextEntry3] : I have seen and examined patient at bedside. I agree with hx, ROS, physical examination, imp/suggestions as written by Dr. Bosch. Please see my note.  Had a long conversation with patient and emphasized on the importance of alcohol cessation.  He was reconnected with the psychosocial team.

## 2024-02-26 NOTE — ASSESSMENT
[FreeTextEntry1] : Wil Soriano is a 64 yo M with hx of AUD and ETOH cirr c/b portal hypertension manifested by EV, no hx bleeding. No ascites. No HE. No HCC.  Currently in liver transplant evaluation. Here for f/u. HCV Donor Acceptance YES.  HCC screening, MRI 6/14 w/o HCC, DUE DEC Leg edema (no ascites), last visit started on lasix 20/Aldactone 50. EV,  was on Coreg and c/o dizziness, e/o fluid overload, last visit switched Coreg to Nadolol 20mg nightly LABS today - MELD  EVAL W/U PENDING: Cardiology, Dr. Weston, pulm htn, PASP 54, pending TTE with [facility], Appt scd 8/24/23 with Dr. Rajesh Reynolds, [address] [place] [address] [phone]. need Clearance letter. PCP referral Dr. Vivar 6/23 (did he go, needs HBV vax, see below) [facility] Psychosocial clearance is pending engagement in an RPP program.  EVAL W/U COMPLETED ID Dr. Lobo, cleared 6/19/23 EGD/COILON 8/3 EGD EV - Esophagus Protruding lesions 2 cords of varices (5mm) in the lower third of the esophagus without red laina sign or stigmata of recent bleeding COLON 5mm rectal polyp not removed, nol colonic mucosa -HBV Nonimmune - was recommended three dose vaccination with PCM with Engerix B (0,2,6 mos)  Cont routine f/u with vascular surgeon - stop herbal meds

## 2024-02-26 NOTE — END OF VISIT
[FreeTextEntry3] : I have seen and examined patient at bedside. I agree with hx, ROS, physical examination, imp/suggestions as written by Ms. Lisa Desir NP. Please see my note.   [Time Spent: ___ minutes] : I have spent [unfilled] minutes of time on the encounter.

## 2024-02-26 NOTE — ASSESSMENT
[FreeTextEntry1] : 64 yo M with hx of AUD and ETOH cirr c/b portal hypertension manifested by jaundice?, EV, no hx bleeding. No ascites. No HE. No HCC.  Currently in liver transplant evaluation. Here for f/u.  HCV Donor Acceptance YES.  #HCC screening, MRI 6/14 w/o HCC- DUE ordered AFP q6 months  #Leg edema (no ascites) resolved leg edema was on lasix 20/Aldactone 50- pt not taking  #EV EGD 8/3/23, EV, no red laina sign or stigmata of recent bleeding c/w coreg  # ETOH relapse pt endorsed he drank in [place] PEth >400 1/24/24 reinforced importance of abstinence from ETOH and risks of worsening cirrhosis which could lead to death advised he needs to show sobriety to be considered for OLTx candidacy Will discuss with KAMAR (transitioned to [place]a[facility] Lindo left [place]) and discuss at Selection Committee  #EVAL W/U PENDING: Cardiology, Dr. Weston, pulm htn, PASP 54, pending TTE with [facility] PCP referral Dr. Vivar 6/23 (did he go, needs HBV vax, see below) KAMAR Monet recs - Psychosocial clearance is pending engagement in an RPP program. - transistioned to Sarah Vogel, seen via video today  She will assist with pt enrolloing in RPP.   EVAL W/U COMPLETED ID Dr. Lobo, cleared 6/19/23  EGD/COLON 8/3 EGD EV - Esophagus Protruding lesions 2 cords of varices (5mm) in the lower third of the esophagus without red laina sign or stigmata of recent bleeding COLON 5mm rectal polyp not removed, nol colonic mucosa -HBV Nonimmune - was recommended three dose vaccination with PCM with Engerix B (0,2,6 mos)  Cont routine f/u with vascular surgeon  stop herbal meds.  RTC in 4 weeks

## 2024-02-26 NOTE — HISTORY OF PRESENT ILLNESS
[TextBox_42] : MELD-Na: 18 Blood Type: O Cause(s) of Liver Disease: Alcoholic Liver Disease Hepatocelluar Carcinoma: No history of hepatocellular carcinoma Wil Soriano is a 62 yo M with hx of AUD and ETOH cirr c/b portal hypertension manifested by EV, no hx bleeding. No ascites. No HE. No HCC. Currently in liver transplant evaluation. HCV Donor Acceptance. Here for f/u.  Last EGD was 2 yrs ago at which time he was put on Nadolol when he was diagnosed with cirr. Never followed up. Had hospitalization 6 mos ago at [facility] when went to hosp for alcohol withdrawal symptoms.  Recent hosp at [facility] for vascular surgery for LLE compartment syndrome due to intramuscular hematoma with active extravasation of L gastrocnemius muscle, s/p fasciotomy on 4/23/23.  Per notes, it is likely that hematoma which caused compartment syndrome is s/t severe thrombocytopenia r/t hx chronic alc use, cirr and liver dysfunction. Hyperbilirubinemia m/l indirect and likely s/t hemolysis and resorption of his hematoma and less likely d/t alc hepatitis or Acute liver failure as his cirr appears to be well compensated.  Did not follow with a hepatologist, was referred after hospitalization/surgery to Dr. Baez who referred for OTLx eval.  EV, nadolol 20 Sober - 10 mos ago, inpatient for one month RPP, no rehab now  MELD 18 [ABO O]  LABS 7/17/23 (6/7) MELD 18 INR 1.66 (1.56) hg 9.9 (9.7) PLT 48 (41) AFP 3 (3.9) SCr 0.86 (0.71) Tb 4.4 (3.5) AST 51 (57) ALT 21 (18)  (234) PEth Negative  STUDIES  EGD 8/3/23 Normal duodenum. Varices in the lower third of the esophagus. Congestion, petechiae and mosaic mucosal pattern in the stomach.  COLON 8/3/23 BBPS 9 Prominent ICV (Biopsy). 5mm rectal polyp overlying rectal varix v large hemorrhoid. Polypectomy was not performed due to high risk of bleeding. PATH 1. Prominent ileocecal valve: - Colonic mucosa within normal limits Repeat 3 yrs  MRI 6/14/23 1. Advanced cirrhosis with trace ascites. No significant varices or portosystemic collaterals. 2. No HCC.  ABDL US 4/25/23 Vessels: Unremarkable visualized components of the abdl aorta and inf vena cava. Unremarkable visualized components of the hepatic veins. Normal hepatopedal flow within portal vein. Diminished flow velocity within main portal vein. Liver: heterogeneous liver with nodular surface contour. Findings c/w cirrhosis. Diffuse fatty infiltration of the liver. No masses. No e/f intrahep ductal diliatation. 12.3 cm sagittal dimension GB: Not distended, No pericholecystic fluid. No gallstones. Prox CBD normal in caliber and measures 4.2mm R Kidney: 12.2 cm sagittal dimension. Increased in echogenicity. R renal scarring. No hydro. Pancreas: Obscured by bowel gas artifact.  IMPRESSION LTD study dt portable technique Cirrhotic liver. Heterogenous liver. Hep steatosis. No masses. Patent hepatopedal flow. Diminished flow velocity within the main portal vein. Echogenic R kidney c/w the medical renal disease. No bile duct dilatation. Obscured pancreas.  TTE 7/19/23 CONCLUSIONS: 1. Normal left ventricular cavity size. The left ventricular wall thickness is normal. The left ventricular systolic function is normal with an ejection fraction visually estimated at 55 to 60 %. There are no regional wall motion abnormalities seen. 2. The left atrium is mildly dilated. 3. Mild to moderate mitral regurgitation. 4. Mild-moderate tricuspid regurgitation. 5. Estimated pulmonary artery systolic pressure is 54 mmHg. 6. Fibrocalcific aortic valve sclerosis without stenosis. NKA  MEDICATIONS  lasix 20 aldactone 50 nadolol 20  taking LIVER WLLNESS function and deto had been discharged on: Folic acid 1mg daily Nadolol 20mg daily - not taking now, ran out Pantoprazole 40mg daily  PSH Fasciotomy, decompressive, LE, posterior compartment 4/23/23  SOCIAL HX , wife Kylie, 3 children former , stopped working 3 months ago Smoking - never smoker ETOH - 6 pack/beer all day and hard liquor since age 14 Drugs - denies Herbals - none Covid vax Pfizer x 3 Coviid infection, 2020  INTERIM HX
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
## 2024-03-03 NOTE — ED ADULT TRIAGE NOTE - PATIENT ON (OXYGEN DELIVERY METHOD)
Isaac Goldberg MD;  Location: [facility] ENDOSCOPY;  Service: Gastroenterology   • EXPLORATORY LAPAROTOMY N/A 2024    Procedure: LAPAROTOMY EXPLORATORY,TOTAL ABDOMINAL COLECTOMY END ILEOSTOMY;  Surgeon: Te Alvarado MD;  Location: Trinity Health Livingston Victor Manuel Cartagena MD;  Location: Research Belton Brand  VIK Styles on Workstation: [id number]      Misbah Stark M.D on Workstation: [id number]      JULIANA/IFTIKHAR/AKANKSHA/PAD/COR/MAHOGANY In-House, NP Nestor Aguilar MD  [MP] Diana Dewey PA-C             [id number]]   Admitting Physician: SIA HENDRICKS [[id number]]   Attending Physician: SIA HENDRICKS [[id number]]                  1103     room air

## 2024-03-11 NOTE — ED PROVIDER NOTE - NS HIV RISK FACTOR YES
Caller: Monie Campo    Relationship: Self    Best call back number: [phone]     What is the best time to reach you: AFTER 9 AM AND BEFORE 1:45 PM AND CAN LEAVE A DETAILED VOICE MESSAGE     Who are you requesting to speak with (clinical staff, provider,  specific staff member): CLINICAL STAFF    Do you know the name of the person who called: THE PATIENT MONIE    What was the call regarding: THE PATIENT MONIE IS REQUESTING A CALL BACK TO KNOW WHETHER OR NOT SHE NEEDS TO BE FASTING FOR HER LAB APPOINTMENT ON 03/28/2024, AND IF SO, CAN SHE HAVE BLACK COFFEE.     Is it okay if the provider responds through Xpress[employer]: NO, PLEASE CALL AND ADVISE.     Declined

## 2024-03-13 ENCOUNTER — OUTPATIENT (OUTPATIENT)
Dept: OUTPATIENT SERVICES | Facility: HOSPITAL | Age: 64
LOS: 1 days | End: 2024-03-13

## 2024-03-13 ENCOUNTER — APPOINTMENT (OUTPATIENT)
Age: 64
End: 2024-03-13
Payer: COMMERCIAL

## 2024-03-13 PROCEDURE — 74183 MRI ABD W/O CNTR FLWD CNTR: CPT | Mod: 26

## 2024-03-18 ENCOUNTER — NON-APPOINTMENT (OUTPATIENT)
Age: 64
End: 2024-03-18

## 2024-03-20 ENCOUNTER — APPOINTMENT (OUTPATIENT)
Dept: HEPATOLOGY | Facility: CLINIC | Age: 64
End: 2024-03-20
Payer: MEDICAID

## 2024-03-20 VITALS
RESPIRATION RATE: 17 BRPM | HEIGHT: 65 IN | OXYGEN SATURATION: 99 % | DIASTOLIC BLOOD PRESSURE: 82 MMHG | TEMPERATURE: 98.6 F | WEIGHT: 182 LBS | HEART RATE: 77 BPM | SYSTOLIC BLOOD PRESSURE: 159 MMHG | BODY MASS INDEX: 30.32 KG/M2

## 2024-03-20 DIAGNOSIS — I85.00 ESOPHAGEAL VARICES W/OUT BLEEDING: ICD-10-CM

## 2024-03-20 PROCEDURE — 99214 OFFICE O/P EST MOD 30 MIN: CPT

## 2024-03-28 ENCOUNTER — APPOINTMENT (OUTPATIENT)
Dept: HEART AND VASCULAR | Facility: CLINIC | Age: 64
End: 2024-03-28
Payer: MEDICAID

## 2024-03-28 VITALS
HEART RATE: 76 BPM | HEIGHT: 65 IN | WEIGHT: 180 LBS | TEMPERATURE: 97.9 F | SYSTOLIC BLOOD PRESSURE: 149 MMHG | OXYGEN SATURATION: 98 % | BODY MASS INDEX: 29.99 KG/M2 | DIASTOLIC BLOOD PRESSURE: 76 MMHG

## 2024-03-28 DIAGNOSIS — I27.20 PULMONARY HYPERTENSION, UNSPECIFIED: ICD-10-CM

## 2024-03-28 PROCEDURE — 99214 OFFICE O/P EST MOD 30 MIN: CPT

## 2024-03-28 RX ORDER — CARVEDILOL 6.25 MG/1
6.25 TABLET, FILM COATED ORAL TWICE DAILY
Qty: 90 | Refills: 3 | Status: ACTIVE | COMMUNITY
Start: 2024-01-25 | End: 1900-01-01

## 2024-03-28 NOTE — ASSESSMENT
[FreeTextEntry1] : 62 year old man with h/o alcohol use disorder and liver cirrhosis decompensated by esophageal varices, left leg compartment syndrome in setting of severe thrombocytopenia s/p fasciotomy 4/2023 here for pre-liver transplant cardiac evaluation. No personal or familial high risk features. Not currently on any medications. Asymptomatic however limited mobility due to left leg compartment syndrome s/p fasciotomy, pending ?revision surgery. Pancytopenic, Hgb 9.7 and plt 51 likely due to sequestration and chronic alcohol use with normal Cr.  - Lexiscan NM stress by Dr. Palumbo which was negative - TTE 7/2023 showing normal biV function, aortic valve sclerosis and mild-to-moderate TR and MR with moderate PASP 54 - TTE with bubble ordered - Pre-liver transplant listing pending TTE with bubble above  #HTN: BP above goal, increase carvedilol to 6.25 BID, continue lasix and spironolactone.

## 2024-03-28 NOTE — REASON FOR VISIT
[FreeTextEntry1] : 63 year old man with h/o alcohol use disorder and liver cirrhosis decompensated by esophageal varices, left leg compartment syndrome in setting of severe thrombocytopenia s/p fasciotomy 2023 here for pre-liver transplant cardiac evaluation. Patient states prior to leg compartment syndrome he was able to walk unlimited distances on flat ground without symptoms. Currently walks around 2 blocks and climbs subway steps though mobility limited by leg pain. Denies any history of syncope, denies chest pain, dyspnea on exertion, palpitations, orthopnea, or PND. No personal history of CVD.   Since last visit, no new symptoms, feeling better overall with more appetite. Believes carvedilol and other medications is helping. Saw Dr. Palumbo who ordered nuclear stress test.    From [facility], immigrated  Never smoker Denies illicits, OTC medications, herbal supplements  FH 3 children, all healthy Father  "old age" at 102  Mother  "old age" at 80s

## 2024-03-28 NOTE — PHYSICAL EXAM
[Normal S1, S2] : normal S1, S2 [No Rub] : no rub [No Gallop] : no gallop [Clear Lung Fields] : clear lung fields [Good Air Entry] : good air entry [Soft] : abdomen soft [Non Tender] : non-tender [de-identified] : 2/6 BRANDT MAYDAB [de-identified] : 1+ bilateral pitting edema to ankles, healing scar on left leg

## 2024-04-24 NOTE — ASSESSMENT
[FreeTextEntry1] : 62 yo M with hx of AUD and ETOH cirr c/b portal hypertension manifested by jaundice?, EV, no hx bleeding. No ascites. No HE. No HCC.  Currently in liver transplant evaluation. Here for f/u.  HCV Donor Acceptance YES.  #HCC screening, MRI 3/13 showing no HCC AFP q6 months  #Leg edema (no ascites) - not taking any diuretics at this time  #EV EGD 8/3/23, EV, no red laina sign or stigmata of recent bleeding c/w coreg  # ETOH relapse pt endorsed he drank in [place] PEth >400 1/24/24 reinforced importance of abstinence from ETOH and risks of worsening cirrhosis which could lead to death advised he needs to show sobriety and attend RPP to be considered for OLTx candidacy Will discuss with KAMAR (transitioned to Malaika Lindo left [place]) and discuss at Selection Committee RPP: [facility], [address] [place] [address], [phone]   #EVAL W/U PENDING: Dental Cardiology, Dr. Weston, pulm htn, PASP 54, pending TTE with bubble. PCP referral Dr. Vivar 6/23 (did he go, needs HBV vax, see below) KAMAR Vogel, pending engagement in RPP program (relapsed). - ********** will arrange video conf with SW today f/u on status of RPP.  EVAL W/U COMPLETED ID Dr. Lobo, cleared 6/19/23  HCM: EGD 8/3/23 EV - Esophagus Protruding lesions 2 cords of varices (5mm) in the lowerthird of the esophagus without red laina sign or stigmata of recent bleeding COLON 8/3/23 5mm rectal polyp not removed, nol colonic mucosa HBV Nonimmune - was recommended three dose vaccination with PCM with Engerix B (0,2,6 mos)  Cont routine f/u with vascular surgeon  stop herbal meds.  RTC in 8 weeks with labs.

## 2024-04-24 NOTE — END OF VISIT
[FreeTextEntry3] : I have seen and examined patient at bedside. I agree with hx, ROS, physical examination, imp/suggestions as written by Ms. Lisa Desir NP. Please see my note.  Patient with cirrhosis and AUD OLT work was done but he was not listed due to drinking and low MELD Elevated PASP could be due to fluid overload  cardiology follow up Importance of alcohol avoidance was discussed  Low salt diet
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
## 2024-05-12 ENCOUNTER — NON-APPOINTMENT (OUTPATIENT)
Age: 64
End: 2024-05-12

## 2024-05-12 NOTE — ASSESSMENT
[FreeTextEntry1] : 62 yo M with hx of AUD and ETOH cirr c/b portal hypertension manifested by jaundice?, EV, no hx bleeding. No ascites. No HE. No HCC.  Currently in liver transplant evaluation. Here for f/u.  #  Cirrhosis/ HCC screening, MRI 3/13, no HCC, DUE SEPT AFP q6 months  # Portal HTN # Leg edema (no ascites) not taking any diuretics at this time # EV EGD 8/3/23, EV, no red laina sign or stigmata of recent bleeding c/w coreg  # ETOH relapse pt endorsed he drank in [place] PEth >400 2/10/24 reinforced importance of abstinence from ETOH and risks of worsening cirrhosis which could lead to death advised he needs to show sobriety and attend RPP to be considered for OLTx candidacy f/b KAMAR Vogel, will remote in to visit today... RPP: [facility], [address] [place] [address], [phone], has pt started  TRANSPLANT EVAL HCV Donor Acceptance YES.  #EVAL W/U PENDING: Dental Cardiology, Dr. Weston, pulm htn, PASP 54, pending TTE with bubble, scd 5/16 PCP referral Dr. Vivar 6/23 (did he go, needs HBV vax, see below) KAMAR Vogel, pending engagement in RPP program (relapsed). - ********** will arrange video conf with KAMAR today f/u on status of RPP.  EVAL W/U COMPLETED ID Dr. Lobo, cleared 6/19/23  HCM: EGD 8/3/23 EV - Esophagus Protruding lesions 2 cords of varices (5mm) in the lowerthird of the esophagus without red laina sign or stigmata of recent bleeding COLON 8/3/23 5mm rectal polyp not removed, nol colonic mucosa HBV Nonimmune - was recommended three dose vaccination with PCM with Engerix B (0,2,6 mos)  Cont routine f/u with vascular surgeon  stop herbal meds.  LABS today RTC in 8 wks with labs.
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
## 2024-05-13 ENCOUNTER — APPOINTMENT (OUTPATIENT)
Dept: HEPATOLOGY | Facility: CLINIC | Age: 64
End: 2024-05-13
Payer: MEDICAID

## 2024-05-16 ENCOUNTER — APPOINTMENT (OUTPATIENT)
Dept: HEART AND VASCULAR | Facility: CLINIC | Age: 64
End: 2024-05-16

## 2024-06-23 ENCOUNTER — NON-APPOINTMENT (OUTPATIENT)
Age: 64
End: 2024-06-23

## 2024-06-24 ENCOUNTER — APPOINTMENT (OUTPATIENT)
Dept: HEPATOLOGY | Facility: CLINIC | Age: 64
End: 2024-06-24
Payer: MEDICAID

## 2024-06-24 VITALS
DIASTOLIC BLOOD PRESSURE: 89 MMHG | WEIGHT: 182 LBS | TEMPERATURE: 99 F | SYSTOLIC BLOOD PRESSURE: 153 MMHG | HEIGHT: 60 IN | OXYGEN SATURATION: 98 % | HEART RATE: 79 BPM | BODY MASS INDEX: 35.73 KG/M2

## 2024-06-24 DIAGNOSIS — Z01.818 ENCOUNTER FOR OTHER PREPROCEDURAL EXAMINATION: ICD-10-CM

## 2024-06-24 DIAGNOSIS — K74.60 UNSPECIFIED CIRRHOSIS OF LIVER: ICD-10-CM

## 2024-06-24 PROCEDURE — 99214 OFFICE O/P EST MOD 30 MIN: CPT

## 2024-06-24 RX ORDER — NADOLOL 20 MG/1
20 TABLET ORAL DAILY
Qty: 30 | Refills: 5 | Status: ACTIVE | COMMUNITY
Start: 2023-09-10 | End: 1900-01-01

## 2024-08-26 ENCOUNTER — APPOINTMENT (OUTPATIENT)
Dept: HEPATOLOGY | Facility: CLINIC | Age: 64
End: 2024-08-26
Payer: MEDICAID

## 2024-08-26 VITALS
OXYGEN SATURATION: 98 % | BODY MASS INDEX: 29.82 KG/M2 | DIASTOLIC BLOOD PRESSURE: 85 MMHG | HEIGHT: 65 IN | WEIGHT: 179 LBS | RESPIRATION RATE: 16 BRPM | HEART RATE: 72 BPM | SYSTOLIC BLOOD PRESSURE: 139 MMHG | TEMPERATURE: 98.9 F

## 2024-08-26 DIAGNOSIS — I85.00 ESOPHAGEAL VARICES W/OUT BLEEDING: ICD-10-CM

## 2024-08-26 DIAGNOSIS — I27.20 PULMONARY HYPERTENSION, UNSPECIFIED: ICD-10-CM

## 2024-08-26 DIAGNOSIS — K74.60 UNSPECIFIED CIRRHOSIS OF LIVER: ICD-10-CM

## 2024-08-26 PROCEDURE — 99214 OFFICE O/P EST MOD 30 MIN: CPT

## 2024-08-28 LAB
ALBUMIN SERPL ELPH-MCNC: 2.7 G/DL
ALP BLD-CCNC: 300 U/L
ALT SERPL-CCNC: 32 U/L
ANION GAP SERPL CALC-SCNC: 7 MMOL/L
AST SERPL-CCNC: 67 U/L
BILIRUB SERPL-MCNC: 5.5 MG/DL
BUN SERPL-MCNC: 11 MG/DL
CALCIUM SERPL-MCNC: 8.7 MG/DL
CHLORIDE SERPL-SCNC: 106 MMOL/L
CO2 SERPL-SCNC: 26 MMOL/L
CREAT SERPL-MCNC: 0.8 MG/DL
EGFR: 98 ML/MIN/1.73M2
GLUCOSE SERPL-MCNC: 96 MG/DL
HCT VFR BLD CALC: 30.2 %
HGB BLD-MCNC: 9.8 G/DL
INR PPP: 1.72 RATIO
MCHC RBC-ENTMCNC: 32.5 GM/DL
MCHC RBC-ENTMCNC: 35.6 PG
MCV RBC AUTO: 109.8 FL
PLATELET # BLD AUTO: 37 K/UL
POTASSIUM SERPL-SCNC: 4.3 MMOL/L
PROT SERPL-MCNC: 7.1 G/DL
PT BLD: 19.1 SEC
RBC # BLD: 2.75 M/UL
RBC # FLD: 20 %
SODIUM SERPL-SCNC: 139 MMOL/L
WBC # FLD AUTO: 3.69 K/UL
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
## 2024-08-28 NOTE — ASSESSMENT
[FreeTextEntry1] : Cirrhosis related to alcohol use disorder He has stated that he stopped drinking Will wait for the screening test to come back His liver function worsened and his MELD is now 20.  If alcohol screening test is negative, he will be invited back for liver transplant evaluation History of esophageal varices.  Continue carvedilol.  In view of worsening liver function, he we will benefit from an upper endoscopy.  I will wait for the echo to be repeated to assess the history of pulmonary hypertension. Importance of alcohol avoidance was discussed with him in details
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
## 2024-08-29 LAB
ALPHA-1-FETOPROTEIN-L3: 10.2 %
ALPHA-1-FETOPROTEIN: 4.8 NG/ML

## 2024-09-02 LAB
PETH 16:0/18:1: NEGATIVE NG/ML
PETH 16:0/18:2: NEGATIVE NG/ML
PETH COMMENTS: NORMAL

## 2024-09-04 LAB — ACARBOXYPROTHROMBIN SERPL-MCNC: 11.7 NG/ML

## 2024-09-09 ENCOUNTER — NON-APPOINTMENT (OUTPATIENT)
Age: 64
End: 2024-09-09

## 2024-09-10 ENCOUNTER — APPOINTMENT (OUTPATIENT)
Dept: HEART AND VASCULAR | Facility: CLINIC | Age: 64
End: 2024-09-10
Payer: MEDICAID

## 2024-09-10 ENCOUNTER — NON-APPOINTMENT (OUTPATIENT)
Age: 64
End: 2024-09-10

## 2024-09-10 VITALS
WEIGHT: 182 LBS | SYSTOLIC BLOOD PRESSURE: 140 MMHG | RESPIRATION RATE: 16 BRPM | BODY MASS INDEX: 30.32 KG/M2 | HEIGHT: 65 IN | DIASTOLIC BLOOD PRESSURE: 80 MMHG | HEART RATE: 76 BPM

## 2024-09-10 DIAGNOSIS — R09.89 OTHER SPECIFIED SYMPTOMS AND SIGNS INVOLVING THE CIRCULATORY AND RESPIRATORY SYSTEMS: ICD-10-CM

## 2024-09-10 DIAGNOSIS — R01.1 CARDIAC MURMUR, UNSPECIFIED: ICD-10-CM

## 2024-09-10 PROCEDURE — 93000 ELECTROCARDIOGRAM COMPLETE: CPT | Mod: NC

## 2024-09-10 PROCEDURE — G2211 COMPLEX E/M VISIT ADD ON: CPT | Mod: NC

## 2024-09-10 PROCEDURE — 99214 OFFICE O/P EST MOD 30 MIN: CPT | Mod: 25

## 2024-09-10 PROCEDURE — 93306 TTE W/DOPPLER COMPLETE: CPT

## 2024-09-10 RX ORDER — FAMOTIDINE 20 MG/1
20 TABLET, FILM COATED ORAL TWICE DAILY
Qty: 60 | Refills: 5 | Status: ACTIVE | COMMUNITY
Start: 2024-09-10

## 2024-09-10 NOTE — ADDENDUM
[FreeTextEntry1] : Scribed by Aleksandra Hankins for Dr. Palumbo, -Sep 10, 2024, 10:20AM-  MM AM/PM -

## 2024-09-10 NOTE — HISTORY OF PRESENT ILLNESS
[FreeTextEntry1] : Denies Chest Pain, SOB, Dizziness, Leg edema, Orthopnea, PND, Palpitations, Syncope, SIMMS, Diaphoresis. Murmur/ Pre-transplant cardiac evaluation - Echo ordered to assess LV function/ possible valvular heart disease.

## 2024-09-10 NOTE — DISCUSSION/SUMMARY
[FreeTextEntry1] : Cardiac murmur - Stable; no further intervention at this time (see echo). Pre transplant evaluation for liver transplant - Echo with normal LV function. Pt. unable to negotiate moving treadmill; Non-walking nuc stress ordered to r/o underlying ischemia. If negative, pt. to be cleared for transplant. Blood work to be reviewed by hepatology with patient. Maintain a low caloric, low sodium, low cholesterol diet. Dietary counseling given, diet and exercise discussed in detail.

## 2024-09-10 NOTE — END OF VISIT
[Time Spent: ___ minutes] : I have spent [unfilled] minutes of time on the encounter which excludes teaching and separately reported services. [FreeTextEntry3] : All medical record entries made by the Scribe were at my Dr. Med Palumbo, direction and personally dictated by me on -Sep 10, 2024, 10:20AM- I have reviewed the chart and agree that the record accurately reflects my personal performance of the history, physical exam, assessment and plan. I have also personally directed, reviewed and agreed with the chart.

## 2024-09-11 ENCOUNTER — APPOINTMENT (OUTPATIENT)
Dept: HEPATOLOGY | Facility: HOSPITAL | Age: 64
End: 2024-09-11

## 2024-09-11 ENCOUNTER — OUTPATIENT (OUTPATIENT)
Dept: OUTPATIENT SERVICES | Facility: HOSPITAL | Age: 64
LOS: 1 days | Discharge: HOME | End: 2024-09-11
Payer: COMMERCIAL

## 2024-09-11 VITALS — HEIGHT: 65 IN | WEIGHT: 182.1 LBS

## 2024-09-11 PROCEDURE — 43235 EGD DIAGNOSTIC BRUSH WASH: CPT

## 2024-09-11 RX ORDER — NADOLOL 20 MG/1
20 TABLET ORAL DAILY
Qty: 90 | Refills: 0 | Status: ACTIVE | COMMUNITY
Start: 2024-09-11 | End: 1900-01-01

## 2024-09-11 NOTE — PRE-ANESTHESIA EVALUATION ADULT - NSANTHINPATMEDSFT_GEN_ALL_CORE
· Carvedilol 6.25 MG Oral Tablet; TAKE 1 TABLET TWICE DAILY, WITH MORNING AND  EVENING MEAL   · Furosemide 20 MG Oral Tablet; TAKE 1 TABLET DAILY   · Nadolol 20 MG Oral Tablet; TAKE 1 TABLET ONCE DAILY   · Spironolactone 50 MG Oral Tablet; TAKE 1 TABLET DAILY

## 2024-09-11 NOTE — PRE-ANESTHESIA EVALUATION ADULT - NSWEIGHTCALCTOOLDRUG_GEN_A_CORE
Patient is under the care of anesthesia at this time.  All VS, monitoring and medication administration being done by Dr. Boateng.    used

## 2024-09-11 NOTE — PRE-ANESTHESIA EVALUATION ADULT - NSANTHPMHFT_GEN_ALL_CORE
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

## 2024-09-18 ENCOUNTER — APPOINTMENT (OUTPATIENT)
Dept: HEART AND VASCULAR | Facility: CLINIC | Age: 64
End: 2024-09-18
Payer: MEDICAID

## 2024-09-18 DIAGNOSIS — Z01.818 ENCOUNTER FOR OTHER PREPROCEDURAL EXAMINATION: ICD-10-CM

## 2024-09-18 PROCEDURE — A9500: CPT

## 2024-09-18 PROCEDURE — 96374 THER/PROPH/DIAG INJ IV PUSH: CPT | Mod: 59

## 2024-09-18 PROCEDURE — 93015 CV STRESS TEST SUPVJ I&R: CPT

## 2024-09-18 PROCEDURE — 78452 HT MUSCLE IMAGE SPECT MULT: CPT

## 2024-10-11 DIAGNOSIS — Z01.818 ENCOUNTER FOR OTHER PREPROCEDURAL EXAMINATION: ICD-10-CM

## 2024-10-11 DIAGNOSIS — I85.00 ESOPHAGEAL VARICES W/OUT BLEEDING: ICD-10-CM

## 2024-10-17 ENCOUNTER — APPOINTMENT (OUTPATIENT)
Dept: HEPATOLOGY | Facility: HOSPITAL | Age: 64
End: 2024-10-17

## (undated) DEVICE — FORCEP RADIAL JAW 4 W NDL 2.2MM 2.8MM 240CM ORANGE DISP